# Patient Record
Sex: FEMALE | Race: OTHER | HISPANIC OR LATINO | URBAN - METROPOLITAN AREA
[De-identification: names, ages, dates, MRNs, and addresses within clinical notes are randomized per-mention and may not be internally consistent; named-entity substitution may affect disease eponyms.]

---

## 2021-12-28 ENCOUNTER — INPATIENT (INPATIENT)
Facility: HOSPITAL | Age: 66
LOS: 5 days | Discharge: HOME | DRG: 371 | End: 2022-01-03
Attending: SURGERY | Admitting: SURGERY
Payer: COMMERCIAL

## 2021-12-28 VITALS
OXYGEN SATURATION: 97 % | WEIGHT: 139.99 LBS | HEART RATE: 82 BPM | RESPIRATION RATE: 20 BRPM | DIASTOLIC BLOOD PRESSURE: 76 MMHG | TEMPERATURE: 99 F | SYSTOLIC BLOOD PRESSURE: 150 MMHG | HEIGHT: 62 IN

## 2021-12-28 DIAGNOSIS — K37 UNSPECIFIED APPENDICITIS: ICD-10-CM

## 2021-12-28 DIAGNOSIS — Z98.891 HISTORY OF UTERINE SCAR FROM PREVIOUS SURGERY: Chronic | ICD-10-CM

## 2021-12-28 LAB
ALBUMIN SERPL ELPH-MCNC: 2.8 G/DL — LOW (ref 3.5–5)
ALP SERPL-CCNC: 108 U/L — SIGNIFICANT CHANGE UP (ref 40–120)
ALT FLD-CCNC: 16 U/L DA — SIGNIFICANT CHANGE UP (ref 10–60)
ANION GAP SERPL CALC-SCNC: 8 MMOL/L — SIGNIFICANT CHANGE UP (ref 5–17)
AST SERPL-CCNC: 11 U/L — SIGNIFICANT CHANGE UP (ref 10–40)
BASOPHILS # BLD AUTO: 0.05 K/UL — SIGNIFICANT CHANGE UP (ref 0–0.2)
BASOPHILS NFR BLD AUTO: 0.3 % — SIGNIFICANT CHANGE UP (ref 0–2)
BILIRUB SERPL-MCNC: 0.7 MG/DL — SIGNIFICANT CHANGE UP (ref 0.2–1.2)
BLD GP AB SCN SERPL QL: SIGNIFICANT CHANGE UP
BUN SERPL-MCNC: 23 MG/DL — HIGH (ref 7–18)
CALCIUM SERPL-MCNC: 9.7 MG/DL — SIGNIFICANT CHANGE UP (ref 8.4–10.5)
CHLORIDE SERPL-SCNC: 92 MMOL/L — LOW (ref 96–108)
CO2 SERPL-SCNC: 32 MMOL/L — HIGH (ref 22–31)
CREAT SERPL-MCNC: 1.14 MG/DL — SIGNIFICANT CHANGE UP (ref 0.5–1.3)
EOSINOPHIL # BLD AUTO: 0.11 K/UL — SIGNIFICANT CHANGE UP (ref 0–0.5)
EOSINOPHIL NFR BLD AUTO: 0.7 % — SIGNIFICANT CHANGE UP (ref 0–6)
GLUCOSE SERPL-MCNC: 357 MG/DL — HIGH (ref 70–99)
HCT VFR BLD CALC: 44.2 % — SIGNIFICANT CHANGE UP (ref 34.5–45)
HGB BLD-MCNC: 14.9 G/DL — SIGNIFICANT CHANGE UP (ref 11.5–15.5)
IMM GRANULOCYTES NFR BLD AUTO: 0.8 % — SIGNIFICANT CHANGE UP (ref 0–1.5)
LACTATE SERPL-SCNC: 2.6 MMOL/L — HIGH (ref 0.7–2)
LIDOCAIN IGE QN: 63 U/L — LOW (ref 73–393)
LYMPHOCYTES # BLD AUTO: 1.64 K/UL — SIGNIFICANT CHANGE UP (ref 1–3.3)
LYMPHOCYTES # BLD AUTO: 10.7 % — LOW (ref 13–44)
MAGNESIUM SERPL-MCNC: 2.1 MG/DL — SIGNIFICANT CHANGE UP (ref 1.6–2.6)
MCHC RBC-ENTMCNC: 29.2 PG — SIGNIFICANT CHANGE UP (ref 27–34)
MCHC RBC-ENTMCNC: 33.7 GM/DL — SIGNIFICANT CHANGE UP (ref 32–36)
MCV RBC AUTO: 86.5 FL — SIGNIFICANT CHANGE UP (ref 80–100)
MONOCYTES # BLD AUTO: 1.43 K/UL — HIGH (ref 0–0.9)
MONOCYTES NFR BLD AUTO: 9.3 % — SIGNIFICANT CHANGE UP (ref 2–14)
NEUTROPHILS # BLD AUTO: 11.94 K/UL — HIGH (ref 1.8–7.4)
NEUTROPHILS NFR BLD AUTO: 78.2 % — HIGH (ref 43–77)
NRBC # BLD: 0 /100 WBCS — SIGNIFICANT CHANGE UP (ref 0–0)
PLATELET # BLD AUTO: 396 K/UL — SIGNIFICANT CHANGE UP (ref 150–400)
POTASSIUM SERPL-MCNC: 4.1 MMOL/L — SIGNIFICANT CHANGE UP (ref 3.5–5.3)
POTASSIUM SERPL-SCNC: 4.1 MMOL/L — SIGNIFICANT CHANGE UP (ref 3.5–5.3)
PROT SERPL-MCNC: 8.4 G/DL — HIGH (ref 6–8.3)
RAPID RVP RESULT: DETECTED
RBC # BLD: 5.11 M/UL — SIGNIFICANT CHANGE UP (ref 3.8–5.2)
RBC # FLD: 11.8 % — SIGNIFICANT CHANGE UP (ref 10.3–14.5)
SARS-COV-2 RNA SPEC QL NAA+PROBE: DETECTED
SODIUM SERPL-SCNC: 132 MMOL/L — LOW (ref 135–145)
WBC # BLD: 15.3 K/UL — HIGH (ref 3.8–10.5)
WBC # FLD AUTO: 15.3 K/UL — HIGH (ref 3.8–10.5)

## 2021-12-28 PROCEDURE — 74177 CT ABD & PELVIS W/CONTRAST: CPT | Mod: 26,MA

## 2021-12-28 PROCEDURE — 99285 EMERGENCY DEPT VISIT HI MDM: CPT

## 2021-12-28 PROCEDURE — 99223 1ST HOSP IP/OBS HIGH 75: CPT

## 2021-12-28 RX ORDER — ONDANSETRON 8 MG/1
4 TABLET, FILM COATED ORAL ONCE
Refills: 0 | Status: COMPLETED | OUTPATIENT
Start: 2021-12-28 | End: 2021-12-28

## 2021-12-28 RX ORDER — GLUCAGON INJECTION, SOLUTION 0.5 MG/.1ML
1 INJECTION, SOLUTION SUBCUTANEOUS ONCE
Refills: 0 | Status: DISCONTINUED | OUTPATIENT
Start: 2021-12-28 | End: 2022-01-03

## 2021-12-28 RX ORDER — KETOROLAC TROMETHAMINE 30 MG/ML
15 SYRINGE (ML) INJECTION ONCE
Refills: 0 | Status: DISCONTINUED | OUTPATIENT
Start: 2021-12-28 | End: 2021-12-28

## 2021-12-28 RX ORDER — SODIUM CHLORIDE 9 MG/ML
1000 INJECTION, SOLUTION INTRAVENOUS
Refills: 0 | Status: DISCONTINUED | OUTPATIENT
Start: 2021-12-28 | End: 2021-12-31

## 2021-12-28 RX ORDER — SODIUM CHLORIDE 9 MG/ML
1000 INJECTION, SOLUTION INTRAVENOUS
Refills: 0 | Status: DISCONTINUED | OUTPATIENT
Start: 2021-12-28 | End: 2022-01-03

## 2021-12-28 RX ORDER — MORPHINE SULFATE 50 MG/1
4 CAPSULE, EXTENDED RELEASE ORAL ONCE
Refills: 0 | Status: DISCONTINUED | OUTPATIENT
Start: 2021-12-28 | End: 2021-12-28

## 2021-12-28 RX ORDER — CEFOTETAN DISODIUM 1 G
1 VIAL (EA) INJECTION ONCE
Refills: 0 | Status: COMPLETED | OUTPATIENT
Start: 2021-12-28 | End: 2021-12-28

## 2021-12-28 RX ORDER — DEXTROSE 50 % IN WATER 50 %
25 SYRINGE (ML) INTRAVENOUS ONCE
Refills: 0 | Status: DISCONTINUED | OUTPATIENT
Start: 2021-12-28 | End: 2022-01-03

## 2021-12-28 RX ORDER — INSULIN LISPRO 100/ML
VIAL (ML) SUBCUTANEOUS EVERY 6 HOURS
Refills: 0 | Status: DISCONTINUED | OUTPATIENT
Start: 2021-12-28 | End: 2021-12-30

## 2021-12-28 RX ORDER — SODIUM CHLORIDE 9 MG/ML
1000 INJECTION INTRAMUSCULAR; INTRAVENOUS; SUBCUTANEOUS ONCE
Refills: 0 | Status: COMPLETED | OUTPATIENT
Start: 2021-12-28 | End: 2021-12-28

## 2021-12-28 RX ORDER — ACETAMINOPHEN 500 MG
1000 TABLET ORAL EVERY 6 HOURS
Refills: 0 | Status: DISCONTINUED | OUTPATIENT
Start: 2021-12-28 | End: 2022-01-03

## 2021-12-28 RX ORDER — ONDANSETRON 8 MG/1
4 TABLET, FILM COATED ORAL EVERY 6 HOURS
Refills: 0 | Status: DISCONTINUED | OUTPATIENT
Start: 2021-12-28 | End: 2022-01-03

## 2021-12-28 RX ORDER — FAMOTIDINE 10 MG/ML
20 INJECTION INTRAVENOUS ONCE
Refills: 0 | Status: COMPLETED | OUTPATIENT
Start: 2021-12-28 | End: 2021-12-28

## 2021-12-28 RX ORDER — DEXTROSE 50 % IN WATER 50 %
12.5 SYRINGE (ML) INTRAVENOUS ONCE
Refills: 0 | Status: DISCONTINUED | OUTPATIENT
Start: 2021-12-28 | End: 2022-01-03

## 2021-12-28 RX ORDER — DIPHENHYDRAMINE HCL 50 MG
50 CAPSULE ORAL ONCE
Refills: 0 | Status: DISCONTINUED | OUTPATIENT
Start: 2021-12-28 | End: 2022-01-03

## 2021-12-28 RX ORDER — PANTOPRAZOLE SODIUM 20 MG/1
40 TABLET, DELAYED RELEASE ORAL DAILY
Refills: 0 | Status: DISCONTINUED | OUTPATIENT
Start: 2021-12-28 | End: 2022-01-03

## 2021-12-28 RX ORDER — HYDROMORPHONE HYDROCHLORIDE 2 MG/ML
0.5 INJECTION INTRAMUSCULAR; INTRAVENOUS; SUBCUTANEOUS
Refills: 0 | Status: DISCONTINUED | OUTPATIENT
Start: 2021-12-28 | End: 2022-01-03

## 2021-12-28 RX ORDER — DEXTROSE 50 % IN WATER 50 %
15 SYRINGE (ML) INTRAVENOUS ONCE
Refills: 0 | Status: DISCONTINUED | OUTPATIENT
Start: 2021-12-28 | End: 2022-01-03

## 2021-12-28 RX ORDER — ACETAMINOPHEN 500 MG
1000 TABLET ORAL ONCE
Refills: 0 | Status: DISCONTINUED | OUTPATIENT
Start: 2021-12-28 | End: 2021-12-28

## 2021-12-28 RX ORDER — CEFOTETAN DISODIUM 1 G
2 VIAL (EA) INJECTION EVERY 12 HOURS
Refills: 0 | Status: DISCONTINUED | OUTPATIENT
Start: 2021-12-28 | End: 2022-01-03

## 2021-12-28 RX ADMIN — MORPHINE SULFATE 4 MILLIGRAM(S): 50 CAPSULE, EXTENDED RELEASE ORAL at 15:54

## 2021-12-28 RX ADMIN — ONDANSETRON 4 MILLIGRAM(S): 8 TABLET, FILM COATED ORAL at 15:53

## 2021-12-28 RX ADMIN — Medication 30 MILLILITER(S): at 15:54

## 2021-12-28 RX ADMIN — MORPHINE SULFATE 4 MILLIGRAM(S): 50 CAPSULE, EXTENDED RELEASE ORAL at 18:35

## 2021-12-28 RX ADMIN — PANTOPRAZOLE SODIUM 40 MILLIGRAM(S): 20 TABLET, DELAYED RELEASE ORAL at 22:01

## 2021-12-28 RX ADMIN — FAMOTIDINE 20 MILLIGRAM(S): 10 INJECTION INTRAVENOUS at 15:54

## 2021-12-28 RX ADMIN — Medication 100 GRAM(S): at 21:48

## 2021-12-28 RX ADMIN — SODIUM CHLORIDE 1000 MILLILITER(S): 9 INJECTION INTRAMUSCULAR; INTRAVENOUS; SUBCUTANEOUS at 18:35

## 2021-12-28 NOTE — ED PROVIDER NOTE - CLINICAL SUMMARY MEDICAL DECISION MAKING FREE TEXT BOX
66F presenting with abdominal pain. concern for diverticulitis, appendicitis, pancreatitis. will get labs, Ct abdomen. will reassess.

## 2021-12-28 NOTE — H&P ADULT - NSHPLABSRESULTS_GEN_ALL_CORE
LABS:                        14.9   15.30 )-----------( 396      ( 28 Dec 2021 16:19 )             44.2     12-28    132<L>  |  92<L>  |  23<H>  ----------------------------<  357<H>  4.1   |  32<H>  |  1.14    Ca    9.7      28 Dec 2021 16:19  Mg     2.1     12-28    TPro  8.4<H>  /  Alb  2.8<L>  /  TBili  0.7  /  DBili  x   /  AST  11  /  ALT  16  /  AlkPhos  108  12-28    RADIOLOGY & ADDITIONAL STUDIES:  CT scan with perforated appendicitis with abscesses.   Official report pending

## 2021-12-28 NOTE — H&P ADULT - ASSESSMENT
66 year old female with perforated appendicitis with abscesses, COVID+. Leukocytosis, electrolyte abnormalities   PMH DM and HTN    - admit to Dr Barber  - conservative management at this time, possible IR consult in the AM  - IV antibiotics  - will follow up official CT results  - FS with RISS  - NPO with IVF  - discussed with Dr. Yi

## 2021-12-28 NOTE — H&P ADULT - ATTENDING COMMENTS
Perforated appendicitis with multiple abscesses, > 7 days of symptoms. Palpable mass, no peritonitis.     Plan:  IR consult in AM for percutaneous drainage  IV Abx

## 2021-12-28 NOTE — ED PROVIDER NOTE - PHYSICAL EXAMINATION
General: uncomfortable appearing female, no acute distress   HEENT: normocephalic, atraumatic   Respiratory: normal work of breathing, lungs clear to auscultation bilaterally   Cardiac: regular rate and rhythm   Abdomen: soft, diffuse tenderness to palpation   MSK: no swelling or tenderness of lower extremities, moving all extremities spontaneously   Skin: warm, dry   Neuro: A&Ox3  Psych: appropriate affect

## 2021-12-28 NOTE — ED ADULT NURSE NOTE - BREATHING, MLM
Patient last ate between 1230 and 1. OR updated. All questions answered; OR to come get patient shortly.    Spontaneous, unlabored and symmetrical

## 2021-12-28 NOTE — ED PROVIDER NOTE - NSFOLLOWUPINSTRUCTIONS_ED_ALL_ED_FT
You were seen in the emergency department for abdominal pain.     Please follow-up with your primary care doctor in the next 24-48 hours.     If you have any worsening symptoms, severe headache, abdominal pain, nausea or vomiting, please return to the emergency department.

## 2021-12-28 NOTE — H&P ADULT - HISTORY OF PRESENT ILLNESS
66 year old female with PMH of HTN and DM and PSH of  presents to the ED complaining of epigastric abdominal pain for the past week associated with 3 episodes of vomiting yesterday. Patient denies current pain and nausea. Patient denies fever, chills, chest pain, shortness of breath, dysuria. Admits to flatus. Last BM 3 days ago. Denies history of COVID.

## 2021-12-28 NOTE — ED PROVIDER NOTE - OBJECTIVE STATEMENT
66F, pmh of hld, DM, presenting with abdominal pain for one week. no nausea, vomiting or diarrhea. no fever, chest pain or shortness of breath. no history of abdominal surgeries.

## 2021-12-28 NOTE — H&P ADULT - NSHPPHYSICALEXAM_GEN_ALL_CORE
Vital Signs Last 24 Hrs  T(C): 37.4 (28 Dec 2021 14:40), Max: 37.4 (28 Dec 2021 14:40)  T(F): 99.4 (28 Dec 2021 14:40), Max: 99.4 (28 Dec 2021 14:40)  HR: 82 (28 Dec 2021 14:40) (82 - 82)  BP: 150/76 (28 Dec 2021 14:40) (150/76 - 150/76)  RR: 20 (28 Dec 2021 14:40) (20 - 20)  SpO2: 97% (28 Dec 2021 14:40) (97% - 97%)    Physical Exam:    General:  Appears stated age, well-groomed, well-nourished, no distress  Eyes: EOMI  HENT:  WNL, no JVD  Chest: respirations nonlabored  Cardiovascular:  Regular rate & rhythm  Abdomen: soft, diffuse tenderness to palpation greatest in epigastric area, moderate distention, non-tympanic    Extremities: no edema bilaterally  Skin: warm and dry  Musculoskeletal: no calf tenderness  Neuro:  Alert, oriented to time, place and person   Psych: normal affect

## 2021-12-29 LAB
ANION GAP SERPL CALC-SCNC: 7 MMOL/L — SIGNIFICANT CHANGE UP (ref 5–17)
APPEARANCE UR: CLEAR — SIGNIFICANT CHANGE UP
APTT BLD: 33 SEC — SIGNIFICANT CHANGE UP (ref 27.5–35.5)
BACTERIA # UR AUTO: ABNORMAL /HPF
BASOPHILS # BLD AUTO: 0.03 K/UL — SIGNIFICANT CHANGE UP (ref 0–0.2)
BASOPHILS NFR BLD AUTO: 0.2 % — SIGNIFICANT CHANGE UP (ref 0–2)
BILIRUB UR-MCNC: ABNORMAL
BUN SERPL-MCNC: 16 MG/DL — SIGNIFICANT CHANGE UP (ref 7–18)
CALCIUM SERPL-MCNC: 9 MG/DL — SIGNIFICANT CHANGE UP (ref 8.4–10.5)
CHLORIDE SERPL-SCNC: 103 MMOL/L — SIGNIFICANT CHANGE UP (ref 96–108)
CO2 SERPL-SCNC: 31 MMOL/L — SIGNIFICANT CHANGE UP (ref 22–31)
COLOR SPEC: YELLOW — SIGNIFICANT CHANGE UP
CREAT SERPL-MCNC: 0.79 MG/DL — SIGNIFICANT CHANGE UP (ref 0.5–1.3)
DIFF PNL FLD: NEGATIVE — SIGNIFICANT CHANGE UP
EOSINOPHIL # BLD AUTO: 0.15 K/UL — SIGNIFICANT CHANGE UP (ref 0–0.5)
EOSINOPHIL NFR BLD AUTO: 1.1 % — SIGNIFICANT CHANGE UP (ref 0–6)
EPI CELLS # UR: SIGNIFICANT CHANGE UP /HPF
GLUCOSE BLDC GLUCOMTR-MCNC: 135 MG/DL — HIGH (ref 70–99)
GLUCOSE BLDC GLUCOMTR-MCNC: 139 MG/DL — HIGH (ref 70–99)
GLUCOSE BLDC GLUCOMTR-MCNC: 148 MG/DL — HIGH (ref 70–99)
GLUCOSE BLDC GLUCOMTR-MCNC: 148 MG/DL — HIGH (ref 70–99)
GLUCOSE SERPL-MCNC: 129 MG/DL — HIGH (ref 70–99)
GLUCOSE UR QL: NEGATIVE — SIGNIFICANT CHANGE UP
HCT VFR BLD CALC: 34.7 % — SIGNIFICANT CHANGE UP (ref 34.5–45)
HCV AB S/CO SERPL IA: 0.08 S/CO — SIGNIFICANT CHANGE UP (ref 0–0.99)
HCV AB SERPL-IMP: SIGNIFICANT CHANGE UP
HGB BLD-MCNC: 11.5 G/DL — SIGNIFICANT CHANGE UP (ref 11.5–15.5)
HYALINE CASTS # UR AUTO: ABNORMAL /LPF
IMM GRANULOCYTES NFR BLD AUTO: 0.8 % — SIGNIFICANT CHANGE UP (ref 0–1.5)
INR BLD: 1.11 RATIO — SIGNIFICANT CHANGE UP (ref 0.88–1.16)
KETONES UR-MCNC: ABNORMAL
LEUKOCYTE ESTERASE UR-ACNC: ABNORMAL
LYMPHOCYTES # BLD AUTO: 0.73 K/UL — LOW (ref 1–3.3)
LYMPHOCYTES # BLD AUTO: 5.6 % — LOW (ref 13–44)
MCHC RBC-ENTMCNC: 29.4 PG — SIGNIFICANT CHANGE UP (ref 27–34)
MCHC RBC-ENTMCNC: 33.1 GM/DL — SIGNIFICANT CHANGE UP (ref 32–36)
MCV RBC AUTO: 88.7 FL — SIGNIFICANT CHANGE UP (ref 80–100)
MONOCYTES # BLD AUTO: 1.12 K/UL — HIGH (ref 0–0.9)
MONOCYTES NFR BLD AUTO: 8.5 % — SIGNIFICANT CHANGE UP (ref 2–14)
NEUTROPHILS # BLD AUTO: 11.01 K/UL — HIGH (ref 1.8–7.4)
NEUTROPHILS NFR BLD AUTO: 83.8 % — HIGH (ref 43–77)
NITRITE UR-MCNC: NEGATIVE — SIGNIFICANT CHANGE UP
NRBC # BLD: 0 /100 WBCS — SIGNIFICANT CHANGE UP (ref 0–0)
PH UR: 6.5 — SIGNIFICANT CHANGE UP (ref 5–8)
PLATELET # BLD AUTO: 284 K/UL — SIGNIFICANT CHANGE UP (ref 150–400)
POTASSIUM SERPL-MCNC: 3.8 MMOL/L — SIGNIFICANT CHANGE UP (ref 3.5–5.3)
POTASSIUM SERPL-SCNC: 3.8 MMOL/L — SIGNIFICANT CHANGE UP (ref 3.5–5.3)
PROT UR-MCNC: 30 MG/DL
PROTHROM AB SERPL-ACNC: 13.1 SEC — SIGNIFICANT CHANGE UP (ref 10.6–13.6)
RBC # BLD: 3.91 M/UL — SIGNIFICANT CHANGE UP (ref 3.8–5.2)
RBC # FLD: 12 % — SIGNIFICANT CHANGE UP (ref 10.3–14.5)
RBC CASTS # UR COMP ASSIST: SIGNIFICANT CHANGE UP /HPF (ref 0–2)
SODIUM SERPL-SCNC: 141 MMOL/L — SIGNIFICANT CHANGE UP (ref 135–145)
SP GR SPEC: 1.01 — SIGNIFICANT CHANGE UP (ref 1.01–1.02)
UROBILINOGEN FLD QL: 4
WBC # BLD: 13.15 K/UL — HIGH (ref 3.8–10.5)
WBC # FLD AUTO: 13.15 K/UL — HIGH (ref 3.8–10.5)
WBC UR QL: SIGNIFICANT CHANGE UP /HPF (ref 0–5)

## 2021-12-29 PROCEDURE — 99232 SBSQ HOSP IP/OBS MODERATE 35: CPT

## 2021-12-29 PROCEDURE — 49406 IMAGE CATH FLUID PERI/RETRO: CPT

## 2021-12-29 RX ADMIN — Medication 6: at 00:40

## 2021-12-29 RX ADMIN — Medication 100 GRAM(S): at 06:25

## 2021-12-29 RX ADMIN — Medication 100 GRAM(S): at 17:34

## 2021-12-29 RX ADMIN — PANTOPRAZOLE SODIUM 40 MILLIGRAM(S): 20 TABLET, DELAYED RELEASE ORAL at 11:49

## 2021-12-29 NOTE — PROCEDURE NOTE - PROCEDURE FINDINGS AND DETAILS
CT guided drainage of right abdominal fluid collection performed with 8.5 Fr drainage catheter inserted. Appx 80cc green cloudy viscous foul smelling fluid drained. Sample collected for microbiology. Drain placed to bag.  Pt tolerated procedure without difficulty. Full report to follow.

## 2021-12-29 NOTE — PATIENT PROFILE ADULT - FALL HARM RISK - HARM RISK INTERVENTIONS

## 2021-12-29 NOTE — PRE PROCEDURE NOTE - HISTORY OF PRESENT ILLNESS
Interventional Radiology  Pre-Procedure Note    This is a 66y  Female  with appendicitis and right lower quadrant abscesses    HPI:  66 year old female with PMH of HTN and DM and PSH of  presents to the ED complaining of epigastric abdominal pain for the past week associated with 3 episodes of vomiting yesterday. Patient denies current pain and nausea. Patient denies fever, chills, chest pain, shortness of breath, dysuria. Admits to flatus. Last BM 3 days ago. Denies history of COVID.  (28 Dec 2021 19:05)      PAST MEDICAL & SURGICAL HISTORY:  DM (diabetes mellitus)    HTN (hypertension)    History of         Social History:     FAMILY HISTORY:      Allergies: penicillin (Hives; Other)      Current Medications: acetaminophen   IVPB .. 1000 milliGRAM(s) IV Intermittent every 6 hours PRN  cefoTEtan  IVPB 2 Gram(s) IV Intermittent every 12 hours  dextrose 40% Gel 15 Gram(s) Oral once  dextrose 5%. 1000 milliLiter(s) IV Continuous <Continuous>  dextrose 5%. 1000 milliLiter(s) IV Continuous <Continuous>  dextrose 50% Injectable 25 Gram(s) IV Push once  dextrose 50% Injectable 12.5 Gram(s) IV Push once  dextrose 50% Injectable 25 Gram(s) IV Push once  diphenhydrAMINE Injectable 50 milliGRAM(s) IV Push once PRN  glucagon  Injectable 1 milliGRAM(s) IntraMuscular once  HYDROmorphone  Injectable 0.5 milliGRAM(s) IV Push every 3 hours PRN  insulin lispro (ADMELOG) corrective regimen sliding scale   SubCutaneous every 6 hours  lactated ringers. 1000 milliLiter(s) IV Continuous <Continuous>  ondansetron Injectable 4 milliGRAM(s) IV Push every 6 hours PRN  pantoprazole  Injectable 40 milliGRAM(s) IV Push daily      Labs:                         11.5   13.15 )-----------( 284      ( 29 Dec 2021 07:09 )             34.7       12-    141  |  103  |  16  ----------------------------<  129<H>  3.8   |  31  |  0.79    Ca    9.0      29 Dec 2021 07:09  Mg     2.1     12-    TPro  8.4<H>  /  Alb  2.8<L>  /  TBili  0.7  /  DBili  x   /  AST  11  /  ALT  16  /  AlkPhos  108  12-    < from: CT Abdomen and Pelvis w/ IV Cont (21 @ 17:46) >  ACC: [id number] EXAM:  CT ABDOMEN AND PELVIS IC                          PROCEDURE DATE:  2021          INTERPRETATION:  CLINICAL INFORMATION: Diffuse abdominal pain.    COMPARISON: None.    CONTRAST/COMPLICATIONS:  IV Contrast: Omnipaque 350  90 cc administered   10 cc discarded  Oral Contrast: NONE  Complications: None reported at time of study completion    PROCEDURE:  CT of the Abdomen and Pelvis was performed.  Sagittal and coronal reformats were performed.    FINDINGS:    LOWER CHEST:No pleural effusion. Clustered tree-in-bud nodularity of the   lingula may reflect distal airways impaction or small airways infection.   There is a 0.2 cm right lower lobe nodule, image 9 series 2. Prominent   right-sided cardiophrenic nodes, measuring up to 9 mm, image 13 series 2.    LIVER: Liver size within normal limits. Main portal vein is patent. Trace   perihepatic fluid.  BILE DUCTS: No biliary distention.  GALLBLADDER: Unremarkable.  SPLEEN: Normal size.  PANCREAS: No main pancreatic ductal dilatation.  ADRENALS: Mild left adrenal gland nodular thickening. Unremarkable right   adrenal gland.  KIDNEYS/URETERS: No hydronephrosis of the kidneys. Subcentimeter   hypodensities of the kidneys are too small to characterize.    BLADDER: Underdistended.  REPRODUCTIVE ORGANS: Uterus and adnexa suboptimally characterized on CT.   Trace fluid along the adnexa, which are otherwise symmetric in   appearance. Loculated fluid along the cul-de-sac measures 2.8 x 1.5 x 1.4   cm, image 117 series 2.    BOWEL and PERITONEUM: Stomach is underdistended. There is mild distention   of the duodenal bulb.    There is proximal to mid small bowel distention with abrupt transition in   the mid abdomen in the region of several loculated fluid collections,   concerning for bowel obstruction, with transition at image 87 series 2,   approximately. The small bowel beyond these dominant collections is   collapsed. For reference, a mid abdominal loculated collection at the   site of transition adjacent to small bowel measures 7.2 x 3.9 x 5.6 cm,   image 98 series 2. A right upper pelvic collection adjacent to several   small bowel loops measures 4.5 x 2.5 x 2.6 cm, image 102 series 2.    The appendix is inflamed in the right lower quadrant, though not clearly   distended, with additional third loculated collection associated   associated with entire length of the appendix and base of the cecum,   measuring 5.1 x 2.3 x 4.6 cm, image 93 series 2. This suggests perforated   appendicitis.    The colon is non-inflamed, nondistended and unremarkable. There is   colonic diverticulosis, without diverticulitis.    There is additional mild mesenteric edema and scattered trace ascites.    PERITONEUM: As above. See 'bowel and peritoneum' section.  VESSELS:No aneurysm of the abdominal aorta. Atheromatous changes. In   particular, there is apparent severe stenosis of the left external iliac   artery, image 111 series 2. Correlate with dedicated CTA of the pelvis   for characterization. Central vein patency cannot be assessed due to   timing of contrast.  RETROPERITONEUM/LYMPH NODES: Small volume nodes.  ABDOMINAL WALL: Unremarkable.  BONES: Negative changes. Central canal and neural foramina are not   adequately assessed on this study.    IMPRESSION:    Perforated appendicitis with multiple abscesses and associated small   bowel obstruction. Abscesses measure 5.1 x 2.3 x 4.6cm at the perforated   appendix, 7.2 x 3.9 x 5.6cm at the site of bowel transition, and 4.5 x   2.5 x 2.6 cm along the right upper pelvis. Additional small abscess of   the cul-de-sac measuring 2.8 x 1.5 x 1.4 cm. Follow to resolution. These   findings were discussed with Dr. Mckeon from ER on 2021 at 6:24 PM.    Clustered tree-in-bud nodularity of the lingula may reflect distal   airways impaction or small airways infection. Follow to resolution.    No aneurysm of the abdominal aorta. Atheromatous changes. In particular,   there is apparent severe stenosis of the left external iliac artery,   image 111 series 2. Correlate with dedicated CTA of the pelvis for   characterization.    --- End of Report ---            GERARDO MONROE M.D., ATTENDING RADIOLOGIST  This document has been electronically signed. Dec 28 2021  6:30PM    < end of copied text >    Assessment/Plan:   This is a 66y Female  presents with perforated appendicitis and right lower abdominal abscesses. Case reviewed with Dr. Yi Surgery. Percutaneous window for access limited by interposed vessels and drainage is high risk. Surgeon feels that drainage is necessary. Procedure/ risks/ benefits/ goals/ alternatives were explained to the patient with the aid of .  All questions answered. Informed content obtained from patient. Consent placed in chart.

## 2021-12-30 ENCOUNTER — TRANSCRIPTION ENCOUNTER (OUTPATIENT)
Age: 66
End: 2021-12-30

## 2021-12-30 LAB
A1C WITH ESTIMATED AVERAGE GLUCOSE RESULT: 9.5 % — HIGH (ref 4–5.6)
ANION GAP SERPL CALC-SCNC: 8 MMOL/L — SIGNIFICANT CHANGE UP (ref 5–17)
BUN SERPL-MCNC: 11 MG/DL — SIGNIFICANT CHANGE UP (ref 7–18)
CALCIUM SERPL-MCNC: 8.3 MG/DL — LOW (ref 8.4–10.5)
CHLORIDE SERPL-SCNC: 104 MMOL/L — SIGNIFICANT CHANGE UP (ref 96–108)
CO2 SERPL-SCNC: 26 MMOL/L — SIGNIFICANT CHANGE UP (ref 22–31)
CREAT SERPL-MCNC: 0.97 MG/DL — SIGNIFICANT CHANGE UP (ref 0.5–1.3)
CULTURE RESULTS: SIGNIFICANT CHANGE UP
ESTIMATED AVERAGE GLUCOSE: 226 MG/DL — HIGH (ref 68–114)
GLUCOSE BLDC GLUCOMTR-MCNC: 139 MG/DL — HIGH (ref 70–99)
GLUCOSE BLDC GLUCOMTR-MCNC: 187 MG/DL — HIGH (ref 70–99)
GLUCOSE BLDC GLUCOMTR-MCNC: 203 MG/DL — HIGH (ref 70–99)
GLUCOSE BLDC GLUCOMTR-MCNC: 213 MG/DL — HIGH (ref 70–99)
GLUCOSE BLDC GLUCOMTR-MCNC: 253 MG/DL — HIGH (ref 70–99)
GLUCOSE SERPL-MCNC: 149 MG/DL — HIGH (ref 70–99)
HCT VFR BLD CALC: 35.5 % — SIGNIFICANT CHANGE UP (ref 34.5–45)
HGB BLD-MCNC: 11.8 G/DL — SIGNIFICANT CHANGE UP (ref 11.5–15.5)
MAGNESIUM SERPL-MCNC: 1.9 MG/DL — SIGNIFICANT CHANGE UP (ref 1.6–2.6)
MCHC RBC-ENTMCNC: 29.5 PG — SIGNIFICANT CHANGE UP (ref 27–34)
MCHC RBC-ENTMCNC: 33.2 GM/DL — SIGNIFICANT CHANGE UP (ref 32–36)
MCV RBC AUTO: 88.8 FL — SIGNIFICANT CHANGE UP (ref 80–100)
NRBC # BLD: 0 /100 WBCS — SIGNIFICANT CHANGE UP (ref 0–0)
PHOSPHATE SERPL-MCNC: 3 MG/DL — SIGNIFICANT CHANGE UP (ref 2.5–4.5)
PLATELET # BLD AUTO: 291 K/UL — SIGNIFICANT CHANGE UP (ref 150–400)
POTASSIUM SERPL-MCNC: 3.7 MMOL/L — SIGNIFICANT CHANGE UP (ref 3.5–5.3)
POTASSIUM SERPL-SCNC: 3.7 MMOL/L — SIGNIFICANT CHANGE UP (ref 3.5–5.3)
RBC # BLD: 4 M/UL — SIGNIFICANT CHANGE UP (ref 3.8–5.2)
RBC # FLD: 12.3 % — SIGNIFICANT CHANGE UP (ref 10.3–14.5)
SODIUM SERPL-SCNC: 138 MMOL/L — SIGNIFICANT CHANGE UP (ref 135–145)
SPECIMEN SOURCE: SIGNIFICANT CHANGE UP
WBC # BLD: 16.48 K/UL — HIGH (ref 3.8–10.5)
WBC # FLD AUTO: 16.48 K/UL — HIGH (ref 3.8–10.5)

## 2021-12-30 PROCEDURE — 99232 SBSQ HOSP IP/OBS MODERATE 35: CPT

## 2021-12-30 RX ORDER — LANOLIN ALCOHOL/MO/W.PET/CERES
3 CREAM (GRAM) TOPICAL AT BEDTIME
Refills: 0 | Status: DISCONTINUED | OUTPATIENT
Start: 2021-12-30 | End: 2022-01-03

## 2021-12-30 RX ORDER — INSULIN LISPRO 100/ML
VIAL (ML) SUBCUTANEOUS
Refills: 0 | Status: DISCONTINUED | OUTPATIENT
Start: 2021-12-30 | End: 2022-01-03

## 2021-12-30 RX ORDER — ENOXAPARIN SODIUM 100 MG/ML
40 INJECTION SUBCUTANEOUS DAILY
Refills: 0 | Status: DISCONTINUED | OUTPATIENT
Start: 2021-12-30 | End: 2022-01-03

## 2021-12-30 RX ADMIN — Medication 100 GRAM(S): at 05:00

## 2021-12-30 RX ADMIN — Medication 100 GRAM(S): at 17:48

## 2021-12-30 RX ADMIN — SODIUM CHLORIDE 100 MILLILITER(S): 9 INJECTION, SOLUTION INTRAVENOUS at 14:41

## 2021-12-30 RX ADMIN — Medication 6: at 23:34

## 2021-12-30 RX ADMIN — PANTOPRAZOLE SODIUM 40 MILLIGRAM(S): 20 TABLET, DELAYED RELEASE ORAL at 13:39

## 2021-12-30 RX ADMIN — ENOXAPARIN SODIUM 40 MILLIGRAM(S): 100 INJECTION SUBCUTANEOUS at 13:39

## 2021-12-30 RX ADMIN — Medication 4: at 17:51

## 2021-12-30 RX ADMIN — Medication 3 MILLIGRAM(S): at 23:18

## 2021-12-30 NOTE — DISCHARGE NOTE PROVIDER - NSDCCPCAREPLAN_GEN_ALL_CORE_FT
PRINCIPAL DISCHARGE DIAGNOSIS  Diagnosis: Appendicitis  Assessment and Plan of Treatment:       SECONDARY DISCHARGE DIAGNOSES  Diagnosis: COVID-19  Assessment and Plan of Treatment:      PRINCIPAL DISCHARGE DIAGNOSIS  Diagnosis: Appendicitis  Assessment and Plan of Treatment: Please follow-up with your surgeon in 1 week. Drink plenty of fluids and rest as needed. Call for any fever over 101, nausea, vomiting, severe pain, no passing of gas or bowel movement.   DIET  You may resume your regular diet as normal.   PAIN CONTROL  You may take Motrin (with food) or Tylenol  as needed for mild pain. Stagger one medication 3 hours after the other for maximum pain control. Maximum daily dose of Tylenol should not exceed 4grams/day.   Antibiotics  Please take the prescribed ciprofloxacin and metronidazole for 2 weeks.  Drain  Please keep drain area dry and clean.   Please monitor daily output of drain, or when drain is full.      SECONDARY DISCHARGE DIAGNOSES  Diagnosis: COVID-19  Assessment and Plan of Treatment: CORONAVIRUS INSTRUCTIONS: Based on your current clinical status and stability, it has been determined that you no longer need hospitalization and can recover while remaining in self-quarantine at home. You should follow the prevention steps below until a healthcare provider or local or state health department says you can return to your normal activities.   1. You should restrict activities outside your home, except for getting medical care.   2. Do not go to work, school, or public areas.   3. Avoid using public transportation, ride-sharing, or taxis.   4. Separate yourself from other people and animals in your home as much as possible.  When you are around other people (e.g., sharing a room or vehicle) you should wear a facemask.  5. Wash your hands often with soap and water for at least 20 seconds, especially after blowing your nose, coughing, or sneezing; going to the bathroom; and before eating or preparing food.  6. Cover your mouth and nose with a tissue when you cough or sneeze. Throw used tissues in a lined trash can. Immediately wash your hands with soap and water for at least 20 seconds  7. High touch surfaces include counters, tabletops, doorknobs, bathroom fixtures, toilets, phones, keyboards, tablets, and bedside tables.  8. Avoid sharing dishes, drinking glasses, cups, eating utensils, towels, or bedding with other people or pets in your home. After using these items, they should be washed thoroughly with soap and water.  You are strongly advised to seek prompt medical attention if your illness worsens or you develop new symptoms like fever or difficulty breathing.

## 2021-12-30 NOTE — DISCHARGE NOTE PROVIDER - CARE PROVIDER_API CALL
Luis Yi (MD)  Surgery  [address] [place] [address]  Phone: [phone]  Fax: [phone]  Follow Up Time:

## 2021-12-30 NOTE — DISCHARGE NOTE PROVIDER - NSDCFUADDAPPT_GEN_ALL_CORE_FT
Follow up with Dr. Yi in 1 week in office  Follow up with Dr. Yi in 1 week in office. Please call to schedule an appointment.

## 2021-12-30 NOTE — DISCHARGE NOTE PROVIDER - NSDCMRMEDTOKEN_GEN_ALL_CORE_FT
dicyclomine 10 mg oral capsule:   metFORMIN 1000 mg oral tablet: 1 tab(s) orally 2 times a day  omeprazole 20 mg oral delayed release capsule: 1 cap(s) orally once a day  Tradjenta 5 mg oral tablet: 1 tab(s) orally once a day   metFORMIN 1000 mg oral tablet: 1 tab(s) orally 2 times a day  omeprazole 20 mg oral delayed release capsule: 1 cap(s) orally once a day  Tradjenta 5 mg oral tablet: 1 tab(s) orally once a day   ciprofloxacin 500 mg oral tablet: 1 tab(s) orally 2 times a day   metFORMIN 1000 mg oral tablet: 1 tab(s) orally 2 times a day  metroNIDAZOLE 500 mg oral tablet: 1 tab(s) orally 3 times a day   omeprazole 20 mg oral delayed release capsule: 1 cap(s) orally once a day  Tradjenta 5 mg oral tablet: 1 tab(s) orally once a day

## 2021-12-30 NOTE — DISCHARGE NOTE PROVIDER - HOSPITAL COURSE
66 year old female with PMH of HTN and DM and PSH of  presents to the ED complaining of epigastric abdominal pain for the past week associated with 3 episodes of vomiting the day before. CT abd/pelvis showed perforated appendicitis with multiple intraabdominal abscesses. Pt placed on abx and IR consulted. IR drained 80cc of purulence from pt's abdomen.     **INCOMPLETE** 66 year old female with PMH of HTN and DM and PSH of  presents to the ED complaining of epigastric abdominal pain for the past week associated with 3 episodes of vomiting the day before. CT abd/pelvis showed perforated appendicitis with multiple intraabdominal abscesses. Pt placed on abx and IR consulted. IR drained 80cc of purulence from pt's abdomen. Antibiotics were given. Diet was advanced as tolerated and according to bowel function, and pain was well controlled on medication. At the time of discharge, the patient was hemodynamically stable, was tolerating PO diet, was voiding urine and passing bowel movement, was ambulating, and was comfortable with adequate pain control. The patient was instructed to follow up with Dr. Yi within 1 week after discharge from the hospital. The patient was instructed to follow up with her primary care provider regarding her elevated A1C and inadequate glucose control. Patient was advised for follow CDC guidelines regarding COVID positive test. Patient was told to return to the ED for any constitutional symptoms such as fever, chills, SOB, chest pain.  The patient agreed and felt comfortable with discharge.    66 year old female with PMH of HTN and DM and PSH of  presents to the ED complaining of epigastric abdominal pain for the past week associated with 3 episodes of vomiting the day before. CT abd/pelvis showed perforated appendicitis with multiple intraabdominal abscesses. Pt placed on abx and IR consulted. IR drained 80cc of purulence from pt's abdomen. Antibiotics were given. Repeat CT abd/pelvis showed the perforated appendix with significantly decreased associated fluid and infiltrative changes and other previously described collections decreased from prior exam, no new collections noted and no evidence of extraluminal gas. Diet was advanced as tolerated and according to bowel function, and pain was well controlled on medication. At the time of discharge, the patient was hemodynamically stable, was tolerating PO diet, was voiding urine and passing bowel movement, was ambulating, and was comfortable with adequate pain control. The patient was instructed to follow up with Dr. Yi within 1 week after discharge from the hospital. The patient was instructed to follow up with her primary care provider regarding her elevated A1C and inadequate glucose control. Patient was advised for follow CDC guidelines regarding COVID positive test. Patient was told to return to the ED for any constitutional symptoms such as fever, chills, SOB, chest pain.  The patient agreed and felt comfortable with discharge.

## 2021-12-31 LAB
ANION GAP SERPL CALC-SCNC: 9 MMOL/L — SIGNIFICANT CHANGE UP (ref 5–17)
BUN SERPL-MCNC: 7 MG/DL — SIGNIFICANT CHANGE UP (ref 7–18)
CALCIUM SERPL-MCNC: 8.3 MG/DL — LOW (ref 8.4–10.5)
CHLORIDE SERPL-SCNC: 105 MMOL/L — SIGNIFICANT CHANGE UP (ref 96–108)
CO2 SERPL-SCNC: 26 MMOL/L — SIGNIFICANT CHANGE UP (ref 22–31)
CREAT SERPL-MCNC: 0.67 MG/DL — SIGNIFICANT CHANGE UP (ref 0.5–1.3)
GLUCOSE BLDC GLUCOMTR-MCNC: 201 MG/DL — HIGH (ref 70–99)
GLUCOSE BLDC GLUCOMTR-MCNC: 226 MG/DL — HIGH (ref 70–99)
GLUCOSE BLDC GLUCOMTR-MCNC: 232 MG/DL — HIGH (ref 70–99)
GLUCOSE BLDC GLUCOMTR-MCNC: 305 MG/DL — HIGH (ref 70–99)
GLUCOSE SERPL-MCNC: 178 MG/DL — HIGH (ref 70–99)
MAGNESIUM SERPL-MCNC: 1.8 MG/DL — SIGNIFICANT CHANGE UP (ref 1.6–2.6)
PHOSPHATE SERPL-MCNC: 2.7 MG/DL — SIGNIFICANT CHANGE UP (ref 2.5–4.5)
POTASSIUM SERPL-MCNC: 4 MMOL/L — SIGNIFICANT CHANGE UP (ref 3.5–5.3)
POTASSIUM SERPL-SCNC: 4 MMOL/L — SIGNIFICANT CHANGE UP (ref 3.5–5.3)
SODIUM SERPL-SCNC: 140 MMOL/L — SIGNIFICANT CHANGE UP (ref 135–145)

## 2021-12-31 PROCEDURE — 99232 SBSQ HOSP IP/OBS MODERATE 35: CPT

## 2021-12-31 RX ADMIN — Medication 400 MILLIGRAM(S): at 18:10

## 2021-12-31 RX ADMIN — Medication 3 MILLIGRAM(S): at 21:34

## 2021-12-31 RX ADMIN — Medication 8: at 12:07

## 2021-12-31 RX ADMIN — Medication 100 GRAM(S): at 17:03

## 2021-12-31 RX ADMIN — Medication 4: at 17:03

## 2021-12-31 RX ADMIN — Medication 4: at 08:21

## 2021-12-31 RX ADMIN — SODIUM CHLORIDE 100 MILLILITER(S): 9 INJECTION, SOLUTION INTRAVENOUS at 05:56

## 2021-12-31 RX ADMIN — PANTOPRAZOLE SODIUM 40 MILLIGRAM(S): 20 TABLET, DELAYED RELEASE ORAL at 12:07

## 2021-12-31 RX ADMIN — Medication 4: at 21:34

## 2021-12-31 RX ADMIN — Medication 100 GRAM(S): at 05:57

## 2021-12-31 RX ADMIN — Medication 1000 MILLIGRAM(S): at 19:43

## 2021-12-31 RX ADMIN — ENOXAPARIN SODIUM 40 MILLIGRAM(S): 100 INJECTION SUBCUTANEOUS at 12:07

## 2021-12-31 NOTE — DIETITIAN INITIAL EVALUATION ADULT. - PERTINENT LABORATORY DATA
12-31 Na140 mmol/L Glu 178 mg/dL<H> K+ 4.0 mmol/L Cr  0.67 mg/dL BUN 7 mg/dL 12-31 Phos 2.7 mg/dL 12-28 Alb 2.8 g/dL<L>

## 2021-12-31 NOTE — DIETITIAN INITIAL EVALUATION ADULT. - OTHER INFO
unable to interview patient face to face as has covid-19. attempted to contact patient On extension in room but No reply . patient is s/p IR drainage of karen appendical abscess (12/29/21). diet just advanced To low-fiber, given DM history, suggest To add carbohydrate consistent To diet. A1C: 9.5, elevated but education deferred at this time.

## 2022-01-01 LAB
-  AMIKACIN: SIGNIFICANT CHANGE UP
-  AMOXICILLIN/CLAVULANIC ACID: SIGNIFICANT CHANGE UP
-  AMPICILLIN/SULBACTAM: SIGNIFICANT CHANGE UP
-  AMPICILLIN: SIGNIFICANT CHANGE UP
-  AZTREONAM: SIGNIFICANT CHANGE UP
-  CEFAZOLIN: SIGNIFICANT CHANGE UP
-  CEFEPIME: SIGNIFICANT CHANGE UP
-  CEFOXITIN: SIGNIFICANT CHANGE UP
-  CEFTRIAXONE: SIGNIFICANT CHANGE UP
-  CIPROFLOXACIN: SIGNIFICANT CHANGE UP
-  ERTAPENEM: SIGNIFICANT CHANGE UP
-  GENTAMICIN: SIGNIFICANT CHANGE UP
-  IMIPENEM: SIGNIFICANT CHANGE UP
-  IMIPENEM: SIGNIFICANT CHANGE UP
-  LEVOFLOXACIN: SIGNIFICANT CHANGE UP
-  MEROPENEM: SIGNIFICANT CHANGE UP
-  PIPERACILLIN/TAZOBACTAM: SIGNIFICANT CHANGE UP
-  TETRACYCLINE: SIGNIFICANT CHANGE UP
-  TOBRAMYCIN: SIGNIFICANT CHANGE UP
-  TRIMETHOPRIM/SULFAMETHOXAZOLE: SIGNIFICANT CHANGE UP
-  VANCOMYCIN: SIGNIFICANT CHANGE UP
ANION GAP SERPL CALC-SCNC: 7 MMOL/L — SIGNIFICANT CHANGE UP (ref 5–17)
BUN SERPL-MCNC: 12 MG/DL — SIGNIFICANT CHANGE UP (ref 7–18)
CALCIUM SERPL-MCNC: 8.4 MG/DL — SIGNIFICANT CHANGE UP (ref 8.4–10.5)
CHLORIDE SERPL-SCNC: 105 MMOL/L — SIGNIFICANT CHANGE UP (ref 96–108)
CO2 SERPL-SCNC: 28 MMOL/L — SIGNIFICANT CHANGE UP (ref 22–31)
CREAT SERPL-MCNC: 0.93 MG/DL — SIGNIFICANT CHANGE UP (ref 0.5–1.3)
CULTURE RESULTS: SIGNIFICANT CHANGE UP
GLUCOSE BLDC GLUCOMTR-MCNC: 204 MG/DL — HIGH (ref 70–99)
GLUCOSE BLDC GLUCOMTR-MCNC: 209 MG/DL — HIGH (ref 70–99)
GLUCOSE BLDC GLUCOMTR-MCNC: 229 MG/DL — HIGH (ref 70–99)
GLUCOSE BLDC GLUCOMTR-MCNC: 254 MG/DL — HIGH (ref 70–99)
GLUCOSE BLDC GLUCOMTR-MCNC: 267 MG/DL — HIGH (ref 70–99)
GLUCOSE SERPL-MCNC: 216 MG/DL — HIGH (ref 70–99)
HCT VFR BLD CALC: 35 % — SIGNIFICANT CHANGE UP (ref 34.5–45)
HGB BLD-MCNC: 11.7 G/DL — SIGNIFICANT CHANGE UP (ref 11.5–15.5)
MCHC RBC-ENTMCNC: 29.3 PG — SIGNIFICANT CHANGE UP (ref 27–34)
MCHC RBC-ENTMCNC: 33.4 GM/DL — SIGNIFICANT CHANGE UP (ref 32–36)
MCV RBC AUTO: 87.7 FL — SIGNIFICANT CHANGE UP (ref 80–100)
METHOD TYPE: SIGNIFICANT CHANGE UP
NRBC # BLD: 0 /100 WBCS — SIGNIFICANT CHANGE UP (ref 0–0)
ORGANISM # SPEC MICROSCOPIC CNT: SIGNIFICANT CHANGE UP
PLATELET # BLD AUTO: 284 K/UL — SIGNIFICANT CHANGE UP (ref 150–400)
POTASSIUM SERPL-MCNC: 4.3 MMOL/L — SIGNIFICANT CHANGE UP (ref 3.5–5.3)
POTASSIUM SERPL-SCNC: 4.3 MMOL/L — SIGNIFICANT CHANGE UP (ref 3.5–5.3)
RBC # BLD: 3.99 M/UL — SIGNIFICANT CHANGE UP (ref 3.8–5.2)
RBC # FLD: 12.2 % — SIGNIFICANT CHANGE UP (ref 10.3–14.5)
SODIUM SERPL-SCNC: 140 MMOL/L — SIGNIFICANT CHANGE UP (ref 135–145)
SPECIMEN SOURCE: SIGNIFICANT CHANGE UP
WBC # BLD: 9.74 K/UL — SIGNIFICANT CHANGE UP (ref 3.8–10.5)
WBC # FLD AUTO: 9.74 K/UL — SIGNIFICANT CHANGE UP (ref 3.8–10.5)

## 2022-01-01 RX ORDER — IOHEXOL 300 MG/ML
30 INJECTION, SOLUTION INTRAVENOUS ONCE
Refills: 0 | Status: COMPLETED | OUTPATIENT
Start: 2022-01-02 | End: 2022-01-02

## 2022-01-01 RX ADMIN — Medication 3 MILLIGRAM(S): at 22:14

## 2022-01-01 RX ADMIN — Medication 4: at 11:52

## 2022-01-01 RX ADMIN — Medication 100 GRAM(S): at 17:16

## 2022-01-01 RX ADMIN — Medication 6: at 17:16

## 2022-01-01 RX ADMIN — Medication 4: at 22:14

## 2022-01-01 RX ADMIN — Medication 100 GRAM(S): at 05:42

## 2022-01-01 RX ADMIN — ENOXAPARIN SODIUM 40 MILLIGRAM(S): 100 INJECTION SUBCUTANEOUS at 11:51

## 2022-01-01 RX ADMIN — Medication 4: at 08:15

## 2022-01-01 RX ADMIN — PANTOPRAZOLE SODIUM 40 MILLIGRAM(S): 20 TABLET, DELAYED RELEASE ORAL at 11:52

## 2022-01-02 LAB
ANION GAP SERPL CALC-SCNC: 9 MMOL/L — SIGNIFICANT CHANGE UP (ref 5–17)
BUN SERPL-MCNC: 10 MG/DL — SIGNIFICANT CHANGE UP (ref 7–18)
CALCIUM SERPL-MCNC: 9.3 MG/DL — SIGNIFICANT CHANGE UP (ref 8.4–10.5)
CHLORIDE SERPL-SCNC: 102 MMOL/L — SIGNIFICANT CHANGE UP (ref 96–108)
CO2 SERPL-SCNC: 28 MMOL/L — SIGNIFICANT CHANGE UP (ref 22–31)
CREAT SERPL-MCNC: 1.15 MG/DL — SIGNIFICANT CHANGE UP (ref 0.5–1.3)
GLUCOSE BLDC GLUCOMTR-MCNC: 194 MG/DL — HIGH (ref 70–99)
GLUCOSE BLDC GLUCOMTR-MCNC: 217 MG/DL — HIGH (ref 70–99)
GLUCOSE BLDC GLUCOMTR-MCNC: 250 MG/DL — HIGH (ref 70–99)
GLUCOSE BLDC GLUCOMTR-MCNC: 257 MG/DL — HIGH (ref 70–99)
GLUCOSE SERPL-MCNC: 233 MG/DL — HIGH (ref 70–99)
HCT VFR BLD CALC: 38.2 % — SIGNIFICANT CHANGE UP (ref 34.5–45)
HGB BLD-MCNC: 12.9 G/DL — SIGNIFICANT CHANGE UP (ref 11.5–15.5)
MCHC RBC-ENTMCNC: 28.9 PG — SIGNIFICANT CHANGE UP (ref 27–34)
MCHC RBC-ENTMCNC: 33.8 GM/DL — SIGNIFICANT CHANGE UP (ref 32–36)
MCV RBC AUTO: 85.7 FL — SIGNIFICANT CHANGE UP (ref 80–100)
NRBC # BLD: 0 /100 WBCS — SIGNIFICANT CHANGE UP (ref 0–0)
PLATELET # BLD AUTO: 291 K/UL — SIGNIFICANT CHANGE UP (ref 150–400)
POTASSIUM SERPL-MCNC: 4.1 MMOL/L — SIGNIFICANT CHANGE UP (ref 3.5–5.3)
POTASSIUM SERPL-SCNC: 4.1 MMOL/L — SIGNIFICANT CHANGE UP (ref 3.5–5.3)
RBC # BLD: 4.46 M/UL — SIGNIFICANT CHANGE UP (ref 3.8–5.2)
RBC # FLD: 12 % — SIGNIFICANT CHANGE UP (ref 10.3–14.5)
SODIUM SERPL-SCNC: 139 MMOL/L — SIGNIFICANT CHANGE UP (ref 135–145)
WBC # BLD: 8.24 K/UL — SIGNIFICANT CHANGE UP (ref 3.8–10.5)
WBC # FLD AUTO: 8.24 K/UL — SIGNIFICANT CHANGE UP (ref 3.8–10.5)

## 2022-01-02 PROCEDURE — 74177 CT ABD & PELVIS W/CONTRAST: CPT | Mod: 26,59

## 2022-01-02 PROCEDURE — 99232 SBSQ HOSP IP/OBS MODERATE 35: CPT

## 2022-01-02 RX ADMIN — Medication 3 MILLIGRAM(S): at 22:23

## 2022-01-02 RX ADMIN — Medication 100 GRAM(S): at 06:18

## 2022-01-02 RX ADMIN — ENOXAPARIN SODIUM 40 MILLIGRAM(S): 100 INJECTION SUBCUTANEOUS at 11:19

## 2022-01-02 RX ADMIN — Medication 4: at 11:20

## 2022-01-02 RX ADMIN — IOHEXOL 30 MILLILITER(S): 300 INJECTION, SOLUTION INTRAVENOUS at 14:37

## 2022-01-02 RX ADMIN — Medication 2: at 16:27

## 2022-01-02 RX ADMIN — PANTOPRAZOLE SODIUM 40 MILLIGRAM(S): 20 TABLET, DELAYED RELEASE ORAL at 11:19

## 2022-01-02 RX ADMIN — Medication 6: at 22:22

## 2022-01-02 RX ADMIN — Medication 100 GRAM(S): at 17:06

## 2022-01-02 RX ADMIN — Medication 4: at 07:45

## 2022-01-03 ENCOUNTER — TRANSCRIPTION ENCOUNTER (OUTPATIENT)
Age: 67
End: 2022-01-03

## 2022-01-03 VITALS
RESPIRATION RATE: 18 BRPM | TEMPERATURE: 98 F | HEART RATE: 71 BPM | OXYGEN SATURATION: 100 % | DIASTOLIC BLOOD PRESSURE: 58 MMHG | SYSTOLIC BLOOD PRESSURE: 151 MMHG

## 2022-01-03 PROBLEM — E11.9 TYPE 2 DIABETES MELLITUS WITHOUT COMPLICATIONS: Chronic | Status: ACTIVE | Noted: 2021-12-28

## 2022-01-03 LAB
GLUCOSE BLDC GLUCOMTR-MCNC: 253 MG/DL — HIGH (ref 70–99)
GLUCOSE BLDC GLUCOMTR-MCNC: 320 MG/DL — HIGH (ref 70–99)
HCT VFR BLD CALC: 35.6 % — SIGNIFICANT CHANGE UP (ref 34.5–45)
HGB BLD-MCNC: 12 G/DL — SIGNIFICANT CHANGE UP (ref 11.5–15.5)
MCHC RBC-ENTMCNC: 28.8 PG — SIGNIFICANT CHANGE UP (ref 27–34)
MCHC RBC-ENTMCNC: 33.7 GM/DL — SIGNIFICANT CHANGE UP (ref 32–36)
MCV RBC AUTO: 85.4 FL — SIGNIFICANT CHANGE UP (ref 80–100)
NRBC # BLD: 0 /100 WBCS — SIGNIFICANT CHANGE UP (ref 0–0)
PLATELET # BLD AUTO: 258 K/UL — SIGNIFICANT CHANGE UP (ref 150–400)
RBC # BLD: 4.17 M/UL — SIGNIFICANT CHANGE UP (ref 3.8–5.2)
RBC # FLD: 12.1 % — SIGNIFICANT CHANGE UP (ref 10.3–14.5)
WBC # BLD: 8.11 K/UL — SIGNIFICANT CHANGE UP (ref 3.8–10.5)
WBC # FLD AUTO: 8.11 K/UL — SIGNIFICANT CHANGE UP (ref 3.8–10.5)

## 2022-01-03 PROCEDURE — 83690 ASSAY OF LIPASE: CPT

## 2022-01-03 PROCEDURE — 85027 COMPLETE CBC AUTOMATED: CPT

## 2022-01-03 PROCEDURE — 85025 COMPLETE CBC W/AUTO DIFF WBC: CPT

## 2022-01-03 PROCEDURE — 49406 IMAGE CATH FLUID PERI/RETRO: CPT

## 2022-01-03 PROCEDURE — 0225U NFCT DS DNA&RNA 21 SARSCOV2: CPT

## 2022-01-03 PROCEDURE — C1769: CPT

## 2022-01-03 PROCEDURE — 82962 GLUCOSE BLOOD TEST: CPT

## 2022-01-03 PROCEDURE — 83605 ASSAY OF LACTIC ACID: CPT

## 2022-01-03 PROCEDURE — 36415 COLL VENOUS BLD VENIPUNCTURE: CPT

## 2022-01-03 PROCEDURE — 87205 SMEAR GRAM STAIN: CPT

## 2022-01-03 PROCEDURE — 86850 RBC ANTIBODY SCREEN: CPT

## 2022-01-03 PROCEDURE — 96375 TX/PRO/DX INJ NEW DRUG ADDON: CPT

## 2022-01-03 PROCEDURE — 87086 URINE CULTURE/COLONY COUNT: CPT

## 2022-01-03 PROCEDURE — 87077 CULTURE AEROBIC IDENTIFY: CPT

## 2022-01-03 PROCEDURE — 81001 URINALYSIS AUTO W/SCOPE: CPT

## 2022-01-03 PROCEDURE — 87186 SC STD MICRODIL/AGAR DIL: CPT

## 2022-01-03 PROCEDURE — 84100 ASSAY OF PHOSPHORUS: CPT

## 2022-01-03 PROCEDURE — 99285 EMERGENCY DEPT VISIT HI MDM: CPT | Mod: 25

## 2022-01-03 PROCEDURE — 74177 CT ABD & PELVIS W/CONTRAST: CPT

## 2022-01-03 PROCEDURE — 85610 PROTHROMBIN TIME: CPT

## 2022-01-03 PROCEDURE — 87070 CULTURE OTHR SPECIMN AEROBIC: CPT

## 2022-01-03 PROCEDURE — 85730 THROMBOPLASTIN TIME PARTIAL: CPT

## 2022-01-03 PROCEDURE — 86900 BLOOD TYPING SEROLOGIC ABO: CPT

## 2022-01-03 PROCEDURE — 86803 HEPATITIS C AB TEST: CPT

## 2022-01-03 PROCEDURE — 86901 BLOOD TYPING SEROLOGIC RH(D): CPT

## 2022-01-03 PROCEDURE — 96374 THER/PROPH/DIAG INJ IV PUSH: CPT

## 2022-01-03 PROCEDURE — 99232 SBSQ HOSP IP/OBS MODERATE 35: CPT

## 2022-01-03 PROCEDURE — 83735 ASSAY OF MAGNESIUM: CPT

## 2022-01-03 PROCEDURE — C1729: CPT

## 2022-01-03 PROCEDURE — 83036 HEMOGLOBIN GLYCOSYLATED A1C: CPT

## 2022-01-03 PROCEDURE — 80053 COMPREHEN METABOLIC PANEL: CPT

## 2022-01-03 PROCEDURE — 80048 BASIC METABOLIC PNL TOTAL CA: CPT

## 2022-01-03 RX ORDER — METRONIDAZOLE 500 MG
1 TABLET ORAL
Qty: 42 | Refills: 0
Start: 2022-01-03 | End: 2022-01-16

## 2022-01-03 RX ORDER — CIPROFLOXACIN LACTATE 400MG/40ML
1 VIAL (ML) INTRAVENOUS
Qty: 28 | Refills: 0
Start: 2022-01-03 | End: 2022-01-16

## 2022-01-03 RX ADMIN — ENOXAPARIN SODIUM 40 MILLIGRAM(S): 100 INJECTION SUBCUTANEOUS at 12:23

## 2022-01-03 RX ADMIN — Medication 6: at 08:17

## 2022-01-03 RX ADMIN — Medication 100 GRAM(S): at 06:14

## 2022-01-03 RX ADMIN — Medication 8: at 12:23

## 2022-01-03 NOTE — DISCHARGE NOTE NURSING/CASE MANAGEMENT/SOCIAL WORK - PATIENT PORTAL LINK FT
You can access the FollowMyHealth Patient Portal offered by [facility] by registering at the following website: [email or web address]. By joining [facility]’s FollowMyHealth portal, you will also be able to view your health information using other applications (apps) compatible with our system.

## 2022-01-03 NOTE — DISCHARGE NOTE NURSING/CASE MANAGEMENT/SOCIAL WORK - NSDCPEFALRISK_GEN_ALL_CORE
For information on Fall & Injury Prevention, visit: [email or web address] OR  [email or web address] OR  https://www.cdc.gov/steadi/patient.html

## 2022-01-03 NOTE — PROGRESS NOTE ADULT - SUBJECTIVE AND OBJECTIVE BOX
INTERVAL HPI/OVERNIGHT EVENTS:  Pt seen and examined at bedside. Complaining she is hungry, requesting food  Afebrile. Abdominal pain resolved     MEDICATIONS  (STANDING):  cefoTEtan  IVPB 2 Gram(s) IV Intermittent every 12 hours  dextrose 40% Gel 15 Gram(s) Oral once  dextrose 5%. 1000 milliLiter(s) (50 mL/Hr) IV Continuous <Continuous>  dextrose 5%. 1000 milliLiter(s) (100 mL/Hr) IV Continuous <Continuous>  dextrose 50% Injectable 25 Gram(s) IV Push once  dextrose 50% Injectable 12.5 Gram(s) IV Push once  dextrose 50% Injectable 25 Gram(s) IV Push once  enoxaparin Injectable 40 milliGRAM(s) SubCutaneous daily  glucagon  Injectable 1 milliGRAM(s) IntraMuscular once  insulin lispro (ADMELOG) corrective regimen sliding scale   SubCutaneous every 6 hours  lactated ringers. 1000 milliLiter(s) (100 mL/Hr) IV Continuous <Continuous>  pantoprazole  Injectable 40 milliGRAM(s) IV Push daily    MEDICATIONS  (PRN):  acetaminophen   IVPB .. 1000 milliGRAM(s) IV Intermittent every 6 hours PRN Moderate Pain (4 - 6)  diphenhydrAMINE Injectable 50 milliGRAM(s) IV Push once PRN Allergy symptoms  HYDROmorphone  Injectable 0.5 milliGRAM(s) IV Push every 3 hours PRN Severe Pain (7 - 10)  ondansetron Injectable 4 milliGRAM(s) IV Push every 6 hours PRN Nausea      Vital Signs Last 24 Hrs  T(C): 36.8 (30 Dec 2021 05:56), Max: 37.4 (29 Dec 2021 12:32)  T(F): 98.2 (30 Dec 2021 05:56), Max: 99.4 (29 Dec 2021 12:32)  HR: 77 (30 Dec 2021 05:56) (77 - 97)  BP: 132/56 (30 Dec 2021 05:56) (124/76 - 135/69)  RR: 18 (30 Dec 2021 05:56) (18 - 18)  SpO2: 98% (30 Dec 2021 05:56) (97% - 100%)    Physical:  General: A&Ox3. NAD  Chest: respiration unlabored  Abdomen: Soft nondistended, nontender. No guarding. IR drain with purulent drainage.     I&O's Detail    29 Dec 2021 07:01  -  30 Dec 2021 07:00  --------------------------------------------------------  IN:  Total IN: 0 mL    OUT:    Drain (mL): 100 mL  Total OUT: 100 mL    Total NET: -100 mL    LABS:                        11.8   16.48 )-----------( 291      ( 30 Dec 2021 07:29 )             35.5             12-30    138  |  104  |  11  ----------------------------<  149<H>  3.7   |  26  |  0.97    Ca    8.3<L>      30 Dec 2021 07:29  Phos  3.0     12-30  Mg     1.9     12-30    TPro  8.4<H>  /  Alb  2.8<L>  /  TBili  0.7  /  DBili  x   /  AST  11  /  ALT  16  /  AlkPhos  108  12-28      
Patient seen and examined at bedside with no complaints.   Admits to flatus/BM.   Denies pain, nausea/ vomiting.   Tolerating diet. On isolation secondary to COVID    Vital Signs Last 24 Hrs  T(F): 98 (01-02-22 @ 06:00), Max: 98.8 (01-01-22 @ 12:32)  HR: 70 (01-02-22 @ 06:00)  BP: 150/57 (01-02-22 @ 06:00)  RR: 18 (01-02-22 @ 06:00)  SpO2: 99% (01-02-22 @ 06:00)  POCT Blood Glucose.: 217 mg/dL (02 Jan 2022 07:39)    GENERAL: Alert, NAD  CHEST/LUNG: respirations nonlabored  ABDOMEN: soft, Minimal suprapubic tenderness to palpation, Nondistended; IR drain with purulent output 15ml/24hours  EXTREMITIES:  no calf tenderness, No edema    I&O's Detail    01 Jan 2022 07:01  -  02 Jan 2022 07:00  --------------------------------------------------------  IN:    Oral Fluid: 250 mL  Total IN: 250 mL    OUT:    Drain (mL): 15 mL  Total OUT: 15 mL    Total NET: 235 mL    LABS:                        12.9   8.24  )-----------( 291      ( 02 Jan 2022 08:51 )             38.2     01-02    139  |  102  |  10  ----------------------------<  233<H>  4.1   |  28  |  1.15    Ca    9.3      02 Jan 2022 08:51  
INTERVAL HPI/OVERNIGHT EVENTS:  Patient seen and examined at bedside.  Pt resting comfortably. No acute complaints.   Tolerating diet. Admits to flatus/ BM.   Ronaldo N/V, pain, SOB, fever, chills.   On isolation secondary to Covid.    MEDICATIONS  (STANDING):  cefoTEtan  IVPB 2 Gram(s) IV Intermittent every 12 hours  dextrose 40% Gel 15 Gram(s) Oral once  dextrose 5%. 1000 milliLiter(s) (50 mL/Hr) IV Continuous <Continuous>  dextrose 5%. 1000 milliLiter(s) (100 mL/Hr) IV Continuous <Continuous>  dextrose 50% Injectable 25 Gram(s) IV Push once  dextrose 50% Injectable 12.5 Gram(s) IV Push once  dextrose 50% Injectable 25 Gram(s) IV Push once  enoxaparin Injectable 40 milliGRAM(s) SubCutaneous daily  glucagon  Injectable 1 milliGRAM(s) IntraMuscular once  insulin lispro (ADMELOG) corrective regimen sliding scale   SubCutaneous Before meals and at bedtime  melatonin 3 milliGRAM(s) Oral at bedtime  pantoprazole  Injectable 40 milliGRAM(s) IV Push daily    MEDICATIONS  (PRN):  acetaminophen   IVPB .. 1000 milliGRAM(s) IV Intermittent every 6 hours PRN Moderate Pain (4 - 6)  diphenhydrAMINE Injectable 50 milliGRAM(s) IV Push once PRN Allergy symptoms  HYDROmorphone  Injectable 0.5 milliGRAM(s) IV Push every 3 hours PRN Severe Pain (7 - 10)  ondansetron Injectable 4 milliGRAM(s) IV Push every 6 hours PRN Nausea      Vital Signs Last 24 Hrs  T(C): 36.8 (03 Jan 2022 06:00), Max: 36.8 (02 Jan 2022 14:30)  T(F): 98.2 (03 Jan 2022 06:00), Max: 98.2 (02 Jan 2022 14:30)  HR: 68 (03 Jan 2022 06:00) (68 - 76)  BP: 142/49 (03 Jan 2022 06:00) (124/64 - 142/49)  BP(mean): --  RR: 17 (03 Jan 2022 06:00) (17 - 18)  SpO2: 100% (03 Jan 2022 06:00) (96% - 100%)    Physical:  General: A&Ox3. NAD.  Respiratory: non labored  Abdomen: Soft, nondistended, nontender, no rebound tenderness  IR drain in place, with purulent output 0ml/24 hr   Extremities: non edematous, no calf pain bilaterally      I&O's Detail    02 Jan 2022 07:01  -  03 Jan 2022 07:00  --------------------------------------------------------  IN:  Total IN: 0 mL    OUT:    Drain (mL): 0 mL  Total OUT: 0 mL    Total NET: 0 mL          LABS:                        12.0   8.11  )-----------( 258      ( 03 Jan 2022 09:12 )             35.6             01-02    139  |  102  |  10  ----------------------------<  233<H>  4.1   |  28  |  1.15    Ca    9.3      02 Jan 2022 08:51            
s/p IR drainage of periappendiceal abscess 12/29  On isolation for COVID19  Patient seen and examined at bedside with no complaints.   Admits to flatus/BM.   Denies pain, nausea/ vomiting.   Tolerating low fiber diet.    Vital Signs Last 24 Hrs  T(F): 97.9 (01-01-22 @ 05:40), Max: 98.4 (12-31-21 @ 20:40)  HR: 65 (01-01-22 @ 05:40)  BP: 150/57 (01-01-22 @ 05:40)  RR: 18 (01-01-22 @ 05:40)  SpO2: 97% (01-01-22 @ 05:40)  POCT Blood Glucose.: 229 mg/dL (01 Jan 2022 08:02)    GENERAL: Alert, NAD  CHEST/LUNG: respirations nonlabored  ABDOMEN: soft, Minimal suprapubic tenderness to palpation, Nondistended; IR drain with purulent output 60ml/24hours  EXTREMITIES:  no calf tenderness, No edema    I&O's Detail    31 Dec 2021 07:01  -  01 Jan 2022 07:00  --------------------------------------------------------  IN:  Total IN: 0 mL    OUT:    Drain (mL): 60 mL  Total OUT: 60 mL    Total NET: -60 mL    LABS:                        11.7   9.74  )-----------( 284      ( 01 Jan 2022 07:05 )             35.0     12-31    140  |  105  |  7   ----------------------------<  178<H>  4.0   |  26  |  0.67    Ca    8.3<L>      31 Dec 2021 05:55  Phos  2.7     12-31  Mg     1.8     12-31    
s/p IR drainage of periappendiceal abscess 12/29  Patient seen and examined at bedside with no complaints.   Admits to flatus/BM.   Denies pain, nausea/ vomiting.   Tolerating liquid diet.    Vital Signs Last 24 Hrs  T(F): 98.1 (12-31-21 @ 05:20), Max: 98.4 (12-30-21 @ 12:49)  HR: 66 (12-31-21 @ 05:20)  BP: 144/71 (12-31-21 @ 05:20)  RR: 18 (12-31-21 @ 05:20)  SpO2: 98% (12-31-21 @ 05:20)  POCT Blood Glucose.: 232 mg/dL (31 Dec 2021 07:38)    GENERAL: Alert, NAD  CHEST/LUNG: respirations nonlabored  ABDOMEN: soft, Nontender, Nondistended; IR drain with purulent output 65ml/24hours  EXTREMITIES:  no calf tenderness, No edema    I&O's Detail    30 Dec 2021 07:01  -  31 Dec 2021 07:00  --------------------------------------------------------  IN:  Total IN: 0 mL    OUT:    Drain (mL): 65 mL  Total OUT: 65 mL    Total NET: -65 mL    LABS:                        11.8   16.48 )-----------( 291      ( 30 Dec 2021 07:29 )             35.5     12-31    140  |  105  |  7   ----------------------------<  178<H>  4.0   |  26  |  0.67    Ca    8.3<L>      31 Dec 2021 05:55  Phos  2.7     12-31  Mg     1.8     12-31  
INTERVAL HPI/OVERNIGHT EVENTS:    No acute events overnight.   Pt resting comfortably. No acute complaints.   denies f/c  epigastric pain  Denies N/V    MEDICATIONS  (STANDING):  cefoTEtan  IVPB 2 Gram(s) IV Intermittent every 12 hours  dextrose 40% Gel 15 Gram(s) Oral once  dextrose 5%. 1000 milliLiter(s) (50 mL/Hr) IV Continuous <Continuous>  dextrose 5%. 1000 milliLiter(s) (100 mL/Hr) IV Continuous <Continuous>  dextrose 50% Injectable 25 Gram(s) IV Push once  dextrose 50% Injectable 12.5 Gram(s) IV Push once  dextrose 50% Injectable 25 Gram(s) IV Push once  glucagon  Injectable 1 milliGRAM(s) IntraMuscular once  insulin lispro (ADMELOG) corrective regimen sliding scale   SubCutaneous every 6 hours  lactated ringers. 1000 milliLiter(s) (100 mL/Hr) IV Continuous <Continuous>  pantoprazole  Injectable 40 milliGRAM(s) IV Push daily    MEDICATIONS  (PRN):  acetaminophen   IVPB .. 1000 milliGRAM(s) IV Intermittent every 6 hours PRN Moderate Pain (4 - 6)  diphenhydrAMINE Injectable 50 milliGRAM(s) IV Push once PRN Allergy symptoms  HYDROmorphone  Injectable 0.5 milliGRAM(s) IV Push every 3 hours PRN Severe Pain (7 - 10)  ondansetron Injectable 4 milliGRAM(s) IV Push every 6 hours PRN Nausea      Vital Signs Last 24 Hrs  T(C): 37.7 (29 Dec 2021 05:49), Max: 37.7 (29 Dec 2021 05:49)  T(F): 99.9 (29 Dec 2021 05:49), Max: 99.9 (29 Dec 2021 05:49)  HR: 64 (29 Dec 2021 05:49) (64 - 82)  BP: 140/56 (29 Dec 2021 05:49) (130/54 - 150/76)  BP(mean): --  RR: 18 (29 Dec 2021 05:49) (17 - 20)  SpO2: 100% (29 Dec 2021 05:49) (97% - 100%)      PHYSICAL EXAM  General: Alert and oriented, not in acute distress  Resp: Breathing unlabored  Abdomen: Soft, nondistended, epigastric ttp  : No quinn catheter, no dysuria or hematuria  Extremities: No pedal edema    I&O's Detail      LABS:                        11.5   13.15 )-----------( 284      ( 29 Dec 2021 07:09 )             34.7             12-29    141  |  103  |  16  ----------------------------<  129<H>  3.8   |  31  |  0.79    Ca    9.0      29 Dec 2021 07:09  Mg     2.1     12-28    TPro  8.4<H>  /  Alb  2.8<L>  /  TBili  0.7  /  DBili  x   /  AST  11  /  ALT  16  /  AlkPhos  108  12-28

## 2022-01-03 NOTE — PROGRESS NOTE ADULT - ASSESSMENT
66 year old female s/p IR drainage of periappendiceal abscess 12/29, perforated appendicitis. Leukocytosis    - continue IR drain, monitor output  - repeat CT scan likely Sunday   - continue antibiotics  - advance to low fiber diet  - discuss with Dr. Yi
66 year old female s/p IR drainage of periappendiceal abscess 12/29, perforated appendicitis. Leukocytosis resolved  COVID19+    - continue IR drain, monitor output  - repeat CT scan in AM with oral and IV cotnrast  - continue antibiotics  - continue low fiber diet  - discuss with Dr. Leiva
66 yoF with perforated appendicitis with multiple fluid collections, and SBO    low grade 99.9, otherwise vss; mild leuko 13  no obstructive symptoms, epigastric ttp likely from distension, nonperitonitic    - NPO with IVF support  - IR consulted for drainage -hepsq held  - continue abx  - dvt/gi ppx, pain control as needed  - d/w attending    
66 yoF with perforated appendicitis with multiple fluid collections s/p IR drainage     +flatus, leukocytosis trending up 16 (13)  abdominal pain resolved    - advance to clear liquid diet  - continue abx  - monitor drain output  - dvt/gi ppx, pain control as needed  
66 year old female s/p IR drainage of periappendiceal abscess 12/29, perforated appendicitis. Leukocytosis resolved  COVID19+    - continue IR drain, monitor output  - repeat CT scan today with oral and IV contrast  - continue antibiotics  - continue low fiber diet  - discharge planning pending CT results  - discuss with Dr. Leiva
66y.o. Female s/p IR drainage of periappendiceal abscess 12/29, perforated appendicitis.   Leukocytosis resolved  COVID19+    - continue IR drain, monitor output   - continue abx  - continue diet as tolerated  - continue COVID isolation precautions   - Pain control, Anti emetic, Anti pyretic PRN   - DVT ppx, ambulate as tolerated, OOB   - d/c planning pending tolerating regular diet and vitals stable   - discussed and confirmed with Dr. Yi

## 2022-01-03 NOTE — PROGRESS NOTE ADULT - ATTENDING COMMENTS
Improving, +BM, tolerating diet. Drain - purulent.     Plan:  Continue Abx  Awaiting culture + sensitivity  Likely d/c home with the drain in place  Repeat CT on Sunday with oral and IV contrast
No abdominal symptoms, CT - resolution of abscesses. Culture - sensistive organisms.     Plan:  d/c home with drain in place, VNS  Cipro/Flagyl PO for 2 weeks  f/u in the office next week
Perforated appendicitis s/p IR drain  Denies pain, nausea or emesis. Having some bowel function    abd soft, non-distended, minimally tender to palpation  drain w/ purulent output    - continue diet  - continue abx  - repeat CT scan today  - continue COVID isolation precautions    Xochiltkejenna Leiva MD  Attending Physician
Significantly improved, drain - purulent.     Plan:  Advance diet to low fiber tomorrow  Awaiting culture results  Repeat CT on Sunday

## 2022-01-04 ENCOUNTER — TRANSCRIPTION ENCOUNTER (OUTPATIENT)
Age: 67
End: 2022-01-04

## 2022-01-05 PROBLEM — Z00.00 ENCOUNTER FOR PREVENTIVE HEALTH EXAMINATION: Status: ACTIVE | Noted: 2022-01-05

## 2022-01-07 ENCOUNTER — NON-APPOINTMENT (OUTPATIENT)
Age: 67
End: 2022-01-07

## 2022-01-11 ENCOUNTER — APPOINTMENT (OUTPATIENT)
Dept: SURGERY | Facility: CLINIC | Age: 67
End: 2022-01-11
Payer: MEDICARE

## 2022-01-11 VITALS — HEART RATE: 67 BPM | DIASTOLIC BLOOD PRESSURE: 77 MMHG | SYSTOLIC BLOOD PRESSURE: 167 MMHG

## 2022-01-11 VITALS — HEIGHT: 62 IN | BODY MASS INDEX: 27.6 KG/M2 | WEIGHT: 150 LBS

## 2022-01-11 VITALS — TEMPERATURE: 96.8 F

## 2022-01-11 DIAGNOSIS — E11.9 TYPE 2 DIABETES MELLITUS W/OUT COMPLICATIONS: ICD-10-CM

## 2022-01-11 DIAGNOSIS — E78.00 PURE HYPERCHOLESTEROLEMIA, UNSPECIFIED: ICD-10-CM

## 2022-01-11 DIAGNOSIS — Z87.891 PERSONAL HISTORY OF NICOTINE DEPENDENCE: ICD-10-CM

## 2022-01-11 PROCEDURE — 99213 OFFICE O/P EST LOW 20 MIN: CPT

## 2022-01-11 RX ORDER — METFORMIN HYDROCHLORIDE 1000 MG/1
1000 TABLET, COATED ORAL
Refills: 0 | Status: ACTIVE | COMMUNITY

## 2022-01-11 RX ORDER — METRONIDAZOLE 500 MG/1
500 TABLET ORAL
Refills: 0 | Status: ACTIVE | COMMUNITY

## 2022-01-11 RX ORDER — LINAGLIPTIN 5 MG/1
TABLET, FILM COATED ORAL
Refills: 0 | Status: ACTIVE | COMMUNITY

## 2022-01-11 RX ORDER — OMEPRAZOLE 20 MG/1
20 CAPSULE, DELAYED RELEASE ORAL
Refills: 0 | Status: ACTIVE | COMMUNITY

## 2022-01-19 ENCOUNTER — OUTPATIENT (OUTPATIENT)
Dept: OUTPATIENT SERVICES | Facility: HOSPITAL | Age: 67
LOS: 1 days | End: 2022-01-19

## 2022-01-19 ENCOUNTER — APPOINTMENT (OUTPATIENT)
Dept: INTERVENTIONAL RADIOLOGY/VASCULAR | Facility: HOSPITAL | Age: 67
End: 2022-01-19

## 2022-01-19 DIAGNOSIS — Z98.891 HISTORY OF UTERINE SCAR FROM PREVIOUS SURGERY: Chronic | ICD-10-CM

## 2022-01-19 DIAGNOSIS — K35.32 ACUTE APPENDICITIS WITH PERFORATION, LOCALIZED PERITONITIS, AND GANGRENE, WITHOUT ABSCESS: ICD-10-CM

## 2022-01-24 NOTE — PATIENT PROFILE ADULT - WILL THE PATIENT ACCEPT THE PFIZER COVID-19 VACCINE IF ELIGIBLE AND IT IS AVAILABLE?
History of laparoscopic appendectomy on January 11, 2022   Post BEL drain  Patient was on IV Zosyn which was changed to IV Unasyn as per ID   BEL drain output is minimal Yes

## 2022-01-25 ENCOUNTER — OUTPATIENT (OUTPATIENT)
Dept: OUTPATIENT SERVICES | Facility: HOSPITAL | Age: 67
LOS: 1 days | End: 2022-01-25
Payer: COMMERCIAL

## 2022-01-25 ENCOUNTER — APPOINTMENT (OUTPATIENT)
Dept: SURGERY | Facility: CLINIC | Age: 67
End: 2022-01-25
Payer: MEDICARE

## 2022-01-25 VITALS
OXYGEN SATURATION: 98 % | DIASTOLIC BLOOD PRESSURE: 74 MMHG | TEMPERATURE: 98 F | WEIGHT: 149.91 LBS | RESPIRATION RATE: 17 BRPM | SYSTOLIC BLOOD PRESSURE: 146 MMHG | HEIGHT: 58 IN | HEART RATE: 71 BPM

## 2022-01-25 VITALS — TEMPERATURE: 97.3 F

## 2022-01-25 VITALS
WEIGHT: 150 LBS | SYSTOLIC BLOOD PRESSURE: 165 MMHG | BODY MASS INDEX: 27.6 KG/M2 | DIASTOLIC BLOOD PRESSURE: 74 MMHG | HEART RATE: 79 BPM | HEIGHT: 62 IN

## 2022-01-25 DIAGNOSIS — K35.32 ACUTE APPENDICITIS WITH PERFORATION, LOCALIZED PERITONITIS, AND GANGRENE, WITHOUT ABSCESS: ICD-10-CM

## 2022-01-25 DIAGNOSIS — Z98.891 HISTORY OF UTERINE SCAR FROM PREVIOUS SURGERY: Chronic | ICD-10-CM

## 2022-01-25 DIAGNOSIS — Z01.818 ENCOUNTER FOR OTHER PREPROCEDURAL EXAMINATION: ICD-10-CM

## 2022-01-25 DIAGNOSIS — Z29.9 ENCOUNTER FOR PROPHYLACTIC MEASURES, UNSPECIFIED: ICD-10-CM

## 2022-01-25 DIAGNOSIS — G47.33 OBSTRUCTIVE SLEEP APNEA (ADULT) (PEDIATRIC): ICD-10-CM

## 2022-01-25 DIAGNOSIS — K21.9 GASTRO-ESOPHAGEAL REFLUX DISEASE WITHOUT ESOPHAGITIS: ICD-10-CM

## 2022-01-25 DIAGNOSIS — E11.9 TYPE 2 DIABETES MELLITUS WITHOUT COMPLICATIONS: ICD-10-CM

## 2022-01-25 LAB — BLD GP AB SCN SERPL QL: SIGNIFICANT CHANGE UP

## 2022-01-25 PROCEDURE — G0463: CPT

## 2022-01-25 PROCEDURE — 99213 OFFICE O/P EST LOW 20 MIN: CPT

## 2022-01-25 RX ORDER — METFORMIN HYDROCHLORIDE 850 MG/1
1 TABLET ORAL
Qty: 0 | Refills: 0 | DISCHARGE

## 2022-01-25 NOTE — HISTORY OF PRESENT ILLNESS
[de-identified] : INEZ NICKEROSN is a 66 year old female with PMHx of HTN and DM and PSH of  who presents in the office s/p perforated appendicitis 1 month ago. She underwent a pigtail drain placement by IR, subsequent CT scan showed resolution of the abscess.\par \par She went to IR last week for drain study and possible removal, but the procedure was cancelled due to positive COVID19 PCR\par \par On exam - she is comfortable, the drain is draining minimal brownish fluid - fecal fistula cannot be ruled out. The drain was flushed and the dressing around it was changed. \par \par Will plan for interval appendectomy as scheduled next week. Drain will stay in place until the surgery in case a fistula is present.

## 2022-01-25 NOTE — H&P PST ADULT - NSICDXPASTMEDICALHX_GEN_ALL_CORE_FT
PAST MEDICAL HISTORY:  DM (diabetes mellitus)     GERD (gastroesophageal reflux disease)     RYLIE (obstructive sleep apnea) . Does not use CPAP anymore

## 2022-01-25 NOTE — H&P PST ADULT - NEGATIVE ALLERGY TYPES
no outdoor environmental allergies/no indoor environmental allergies/no reactions to food/no reactions to insect bites

## 2022-01-25 NOTE — PHYSICAL EXAM
[Normal Breath Sounds] : Normal breath sounds [Normal Heart Sounds] : normal heart sounds [Calm] : calm [de-identified] : NAD [de-identified] : WNL [de-identified] : MIKAYLAL [de-identified] : soft, non-tender, no mass. Pigtail drain - minimal brownish output, site without signs of infection.

## 2022-01-25 NOTE — H&P PST ADULT - PROBLEM SELECTOR PLAN 2
Patient was diagnosed with sleep apnea "a long time ago" and was using CPAP  She states now she is better, the company took back the CPAP machine  Treat as if with RYLIE.  RYLIE protocol periop

## 2022-01-25 NOTE — H&P PST ADULT - ASSESSMENT
11/13/19 1100   Plan   Plan SS attempted to contact [facility] [phone] without success.  Contacted [facility] [phone] per Sonja 67 yo female is scheduled for : Laparoscopic Interval Appendectomy, on 2/3/22

## 2022-01-25 NOTE — H&P PST ADULT - NSICDXFAMILYHX_GEN_ALL_CORE_FT
FAMILY HISTORY:  Sibling  Still living? Yes, Estimated age: Age Unknown  FH: type 2 diabetes, Age at diagnosis: Age Unknown

## 2022-01-25 NOTE — H&P PST ADULT - HISTORY OF PRESENT ILLNESS
67 yo female with history of DM, GERD, Obesity, RYLIE reports the above.  I had a lot of pain in my right abdomen in December, I found out that my appendix was the problem. They put a drain right now. I still feel some pain at times, said patient. She is scheduled for : Laparoscopic Interval Appendectomy, on 2/3/22

## 2022-02-01 LAB — SARS-COV-2 N GENE NPH QL NAA+PROBE: DETECTED

## 2022-02-02 ENCOUNTER — TRANSCRIPTION ENCOUNTER (OUTPATIENT)
Age: 67
End: 2022-02-02

## 2022-02-03 ENCOUNTER — APPOINTMENT (OUTPATIENT)
Dept: SURGERY | Facility: HOSPITAL | Age: 67
End: 2022-02-03

## 2022-02-03 ENCOUNTER — OUTPATIENT (OUTPATIENT)
Dept: OUTPATIENT SERVICES | Facility: HOSPITAL | Age: 67
LOS: 1 days | End: 2022-02-03
Payer: COMMERCIAL

## 2022-02-03 ENCOUNTER — RESULT REVIEW (OUTPATIENT)
Age: 67
End: 2022-02-03

## 2022-02-03 VITALS
WEIGHT: 149.91 LBS | TEMPERATURE: 98 F | RESPIRATION RATE: 16 BRPM | HEART RATE: 70 BPM | DIASTOLIC BLOOD PRESSURE: 54 MMHG | OXYGEN SATURATION: 100 % | HEIGHT: 58 IN | SYSTOLIC BLOOD PRESSURE: 131 MMHG

## 2022-02-03 VITALS
HEART RATE: 79 BPM | OXYGEN SATURATION: 98 % | DIASTOLIC BLOOD PRESSURE: 61 MMHG | RESPIRATION RATE: 16 BRPM | SYSTOLIC BLOOD PRESSURE: 112 MMHG | TEMPERATURE: 98 F

## 2022-02-03 DIAGNOSIS — Z98.891 HISTORY OF UTERINE SCAR FROM PREVIOUS SURGERY: Chronic | ICD-10-CM

## 2022-02-03 DIAGNOSIS — K35.32 ACUTE APPENDICITIS WITH PERFORATION, LOCALIZED PERITONITIS, AND GANGRENE, WITHOUT ABSCESS: ICD-10-CM

## 2022-02-03 DIAGNOSIS — Z01.818 ENCOUNTER FOR OTHER PREPROCEDURAL EXAMINATION: ICD-10-CM

## 2022-02-03 LAB
BLD GP AB SCN SERPL QL: SIGNIFICANT CHANGE UP
GLUCOSE BLDC GLUCOMTR-MCNC: 169 MG/DL — HIGH (ref 70–99)
GLUCOSE BLDC GLUCOMTR-MCNC: 169 MG/DL — HIGH (ref 70–99)

## 2022-02-03 PROCEDURE — C9399: CPT

## 2022-02-03 PROCEDURE — 88304 TISSUE EXAM BY PATHOLOGIST: CPT | Mod: 26

## 2022-02-03 PROCEDURE — 44970 LAPAROSCOPY APPENDECTOMY: CPT

## 2022-02-03 PROCEDURE — 86900 BLOOD TYPING SEROLOGIC ABO: CPT

## 2022-02-03 PROCEDURE — 82962 GLUCOSE BLOOD TEST: CPT

## 2022-02-03 PROCEDURE — 86901 BLOOD TYPING SEROLOGIC RH(D): CPT

## 2022-02-03 PROCEDURE — 44970 LAPAROSCOPY APPENDECTOMY: CPT | Mod: 22

## 2022-02-03 PROCEDURE — 88304 TISSUE EXAM BY PATHOLOGIST: CPT

## 2022-02-03 PROCEDURE — 36415 COLL VENOUS BLD VENIPUNCTURE: CPT

## 2022-02-03 PROCEDURE — 44970 LAPAROSCOPY APPENDECTOMY: CPT | Mod: AS

## 2022-02-03 PROCEDURE — C1889: CPT

## 2022-02-03 PROCEDURE — 86850 RBC ANTIBODY SCREEN: CPT

## 2022-02-03 DEVICE — STAPLER COVIDIEN TRI-STAPLE 45MM PURPLE RELOAD: Type: IMPLANTABLE DEVICE | Status: FUNCTIONAL

## 2022-02-03 DEVICE — STAPLER COVIDIEN TRI-STAPLE 45MM TAN RELOAD: Type: IMPLANTABLE DEVICE | Status: FUNCTIONAL

## 2022-02-03 DEVICE — CLIP APPLIER COVIDIEN ENDOCLIP II 10MM MED/LG: Type: IMPLANTABLE DEVICE | Status: FUNCTIONAL

## 2022-02-03 RX ORDER — SODIUM CHLORIDE 9 MG/ML
1000 INJECTION, SOLUTION INTRAVENOUS
Refills: 0 | Status: DISCONTINUED | OUTPATIENT
Start: 2022-02-03 | End: 2022-02-03

## 2022-02-03 RX ORDER — METFORMIN HYDROCHLORIDE 850 MG/1
1 TABLET ORAL
Qty: 0 | Refills: 0 | DISCHARGE

## 2022-02-03 RX ORDER — FENTANYL CITRATE 50 UG/ML
50 INJECTION INTRAVENOUS
Refills: 0 | Status: DISCONTINUED | OUTPATIENT
Start: 2022-02-03 | End: 2022-02-03

## 2022-02-03 RX ORDER — IBUPROFEN 200 MG
600 TABLET ORAL ONCE
Refills: 0 | Status: DISCONTINUED | OUTPATIENT
Start: 2022-02-03 | End: 2022-02-10

## 2022-02-03 RX ORDER — FENTANYL CITRATE 50 UG/ML
25 INJECTION INTRAVENOUS
Refills: 0 | Status: DISCONTINUED | OUTPATIENT
Start: 2022-02-03 | End: 2022-02-03

## 2022-02-03 RX ORDER — IBUPROFEN 200 MG
1 TABLET ORAL
Qty: 10 | Refills: 0
Start: 2022-02-03 | End: 2022-02-07

## 2022-02-03 RX ORDER — SODIUM CHLORIDE 9 MG/ML
3 INJECTION INTRAMUSCULAR; INTRAVENOUS; SUBCUTANEOUS EVERY 8 HOURS
Refills: 0 | Status: DISCONTINUED | OUTPATIENT
Start: 2022-02-03 | End: 2022-02-03

## 2022-02-03 RX ORDER — LINAGLIPTIN 5 MG/1
1 TABLET, FILM COATED ORAL
Qty: 0 | Refills: 0 | DISCHARGE

## 2022-02-03 RX ORDER — OMEPRAZOLE 10 MG/1
1 CAPSULE, DELAYED RELEASE ORAL
Qty: 0 | Refills: 0 | DISCHARGE

## 2022-02-03 RX ORDER — ACETAMINOPHEN 500 MG
2 TABLET ORAL
Qty: 0 | Refills: 0 | DISCHARGE

## 2022-02-03 RX ADMIN — SODIUM CHLORIDE 3 MILLILITER(S): 9 INJECTION INTRAMUSCULAR; INTRAVENOUS; SUBCUTANEOUS at 07:32

## 2022-02-03 NOTE — ASU DISCHARGE PLAN (ADULT/PEDIATRIC) - CARE PROVIDER_API CALL
Luis Yi (MD)  Surgery  [address] [place] [address]  Phone: [phone]  Fax: [phone]  Follow Up Time: 2 weeks

## 2022-02-03 NOTE — ASU DISCHARGE PLAN (ADULT/PEDIATRIC) - NS MD DC FALL RISK RISK
For information on Fall & Injury Prevention, visit: [email or web address] OR  [email or web address] OR  https://www.cdc.gov/steadi/patient.html

## 2022-02-03 NOTE — ASU PREOP CHECKLIST - 1.
COVID positive from 2/1/22, (tested positive from 1/15/22)  as per nurse manager Felipa dinh to proceed w/ [email or web address]

## 2022-02-03 NOTE — BRIEF OPERATIVE NOTE - NSICDXBRIEFPOSTOP_GEN_ALL_CORE_FT
POST-OP DIAGNOSIS:  Acute appendicitis with perforation and localized peritonitis 03-Feb-2022 10:48:36  Isabelle Craig

## 2022-02-03 NOTE — ASU DISCHARGE PLAN (ADULT/PEDIATRIC) - ASU DC SPECIAL INSTRUCTIONSFT
Please follow-up with your surgeon in 1 week. Drink plenty of fluids and rest as needed. Call for any fever over 101, nausea, vomiting, severe pain, no passing of gas or bowel movement.       DIET   You may resume your regular diet as normal.       SURGICAL SITES   Remove outer dressing and keep white steri-strips in place allowing them to fall off on their own. You may shower 48 hours post-operatively but do not bathe or soak in the water for 1-2 weeks; pat dry. If you notice any signs of surgical site infection (ie. redness, swelling, pain, pus drainage, feculent material), please seek medical care immediately.      ACTIVITY Do not lift anything heavier than 10 pounds for 2 weeks (2-3 months for hernia, no excersice for 2 weeks) and avoid strenuous activity for 4-6 weeks.       PAIN CONTROL   You may take Motrin 600mg (with food) or Tylenol 650mg as needed for mild pain. Stagger one medication 3 hours after the other for maximum pain control. Maximum daily dose of Tylenol should not exceed 4grams/day.  You may take your prescribed oxycodone for severe breakthrough pain not that is not relieved by Tylenol/Motrin. Do not drive or make important decisions while taking this medication and do not take more than 4 pills in 24 hours. Please refer to medical records/ progress notes for in depth hospital course. Discharge plan discussed and agreed with attending.

## 2022-02-03 NOTE — BRIEF OPERATIVE NOTE - NSICDXBRIEFPREOP_GEN_ALL_CORE_FT
PRE-OP DIAGNOSIS:  Acute appendicitis with perforation and localized peritonitis 03-Feb-2022 10:48:31  Isabelle Craig

## 2022-02-03 NOTE — ASU PATIENT PROFILE, ADULT - FALL HARM RISK - UNIVERSAL INTERVENTIONS
Bed in lowest position, wheels locked, appropriate side rails in place/Call bell, personal items and telephone in reach/Instruct patient to call for assistance before getting out of bed or chair/Non-slip footwear when patient is out of bed/[place] to call system/Physically safe environment - no spills, clutter or unnecessary equipment/Purposeful Proactive Rounding/Room/bathroom lighting operational, light cord in reach

## 2022-02-07 NOTE — PLAN
[FreeTextEntry1] : Ms. INEZ NICKERSON Patient was told significance of findings, options, risks and benefits were explained. Informed consent for  interval appendectomy and potential risks, benefits and alternatives (surgical options were discussed including non-surgical options or the option of no surgery) to the planned surgery were discussed in depth. All surgical options were discussed including non-surgical treatments. The patient wishes to proceed with surgery. We will plan for surgery on at the next available date, pending any required insurance pre-certification or pre-approval. Patient agrees to obtain any necessary pre-operative evaluations and testing prior to surgery.\par Patient advised to seek immediate medical attention with any acute change in symptoms or with the development of any new or worsening symptoms. Patient's questions and concerns addressed to patient's satisfaction, and patient verbalized an understanding of the information discussed.\par \par Will schedule for the surgery at [facility].\par \par PST\par COVID\par Medical Clearance

## 2022-02-07 NOTE — HISTORY OF PRESENT ILLNESS
[de-identified] : INEZ NICKERSON is a 66 year old female with PMHx of HTN and DM and PSH of  who presents in the office for post hospitalization visit. Patient was admitted to [facility] with perforated appendicitis. Patient was treated with IV antibiotics and she underwent a CT-guided abscess IR drained. She was discharged home with IV antibiotics. Patient has IR drain that draining small amount of fluid. Patient is on oral antibiotics and will follow up with IR for drain evaluation and removal.

## 2022-02-07 NOTE — PHYSICAL EXAM
[Normal Breath Sounds] : Normal breath sounds [Normal Heart Sounds] : normal heart sounds [Normal Rate and Rhythm] : normal rate and rhythm [No Rash or Lesion] : No rash or lesion [Alert] : alert [Oriented to Person] : oriented to person [Oriented to Place] : oriented to place [Oriented to Time] : oriented to time [Calm] : calm [de-identified] : The patient is alert, well-groomed  [de-identified] : Normoactive bowel sounds, soft and nontender, no hepatosplenomegaly or masses noted, Patient has IR drain with minimal drainage  [de-identified] : full range of motion and no deformities appreciated.

## 2022-02-07 NOTE — ASSESSMENT
[FreeTextEntry1] : INEZ NICKERSON is a 66 year old female with Perforated appendicitis\par \par \par Patient is doing well. Patient has IR drain that draining small amount of fluid. Patient is on oral antibiotics and will follow up with IR for drain evaluation and removal. \par \par Results of her recent imaging and physical examination findings were discussed in detail. she was informed of significance of findings. All of the options, risks and benefits were explained. \par \par We recommend an interval appendectomy in the end of February. She agrees to proceed.

## 2022-02-08 PROBLEM — K21.9 GASTRO-ESOPHAGEAL REFLUX DISEASE WITHOUT ESOPHAGITIS: Chronic | Status: ACTIVE | Noted: 2022-01-25

## 2022-02-08 PROBLEM — G47.33 OBSTRUCTIVE SLEEP APNEA (ADULT) (PEDIATRIC): Chronic | Status: ACTIVE | Noted: 2022-01-25

## 2022-02-08 LAB — SURGICAL PATHOLOGY STUDY: SIGNIFICANT CHANGE UP

## 2022-02-22 ENCOUNTER — APPOINTMENT (OUTPATIENT)
Dept: SURGERY | Facility: CLINIC | Age: 67
End: 2022-02-22
Payer: MEDICARE

## 2022-02-22 VITALS
BODY MASS INDEX: 27.23 KG/M2 | HEIGHT: 62 IN | WEIGHT: 148 LBS | DIASTOLIC BLOOD PRESSURE: 84 MMHG | HEART RATE: 74 BPM | SYSTOLIC BLOOD PRESSURE: 153 MMHG

## 2022-02-22 VITALS — TEMPERATURE: 96.5 F

## 2022-02-22 DIAGNOSIS — K35.32 ACUTE APPENDICITIS W/ PERFORATION AND LOCALIZED PERITONITIS, W/O ABSCESS: ICD-10-CM

## 2022-02-22 PROCEDURE — 99024 POSTOP FOLLOW-UP VISIT: CPT

## 2022-03-03 NOTE — HISTORY OF PRESENT ILLNESS
[de-identified] : INEZ NICKERSON is a 66 year old female who presents in the office for postop visit. She underwent a Laparoscopic appendectomy and lysis of intestinal adhesions over 1 hour on 02/03/2022 pathology results are consistent with appendiceal diverticulosis with periappendicitis Today patient is doing well, offers no complaints. Denies any fevers, chills, nausea, vomiting, diarrhea or constipation. Patient able to tolerate regular diet with normal bowel movements. Surgical incisions are healing well. No sign of inflammation or exudate.\par

## 2022-03-03 NOTE — ASSESSMENT
[FreeTextEntry1] : INEZ NICKERSON is a 66 year old female who underwent a Laparoscopic appendectomy and lysis of intestinal adhesions 02/03/2022 pathology results are consistent with appendiceal diverticulosis with periappendicitis\par \par \par \par Patient is doing well. All surgical incisions are healing well and as expected. There is no evidence of infection or complication, and patient is progressing as expected. Post-operative wound care, activity, restrictions and precautions reinforced.  Pathology results were discussed in detail. Patient was instructed no heavy lifting  4-6 weeks. Patient's questions and concerns addressed to patient's satisfaction. \par

## 2022-03-03 NOTE — REASON FOR VISIT
[Post Op: _________] : a [unfilled] post op visit [FreeTextEntry1] : s/p Laparoscopic appendectomy and lysis of intestinal adhesions over 1 hour on 02/03/2022

## 2022-03-03 NOTE — PHYSICAL EXAM
[Normal Breath Sounds] : Normal breath sounds [Normal Heart Sounds] : normal heart sounds [Normal Rate and Rhythm] : normal rate and rhythm [No Rash or Lesion] : No rash or lesion [Alert] : alert [Oriented to Person] : oriented to person [Oriented to Place] : oriented to place [Oriented to Time] : oriented to time [Calm] : calm [de-identified] : The patient is alert, well-groomed  [de-identified] : Incision sites are healing well  [de-identified] : full range of motion and no deformities appreciated.

## 2022-09-21 NOTE — H&P PST ADULT - BMI (KG/M2)
Pt  is requesting forms to [facility] to be filled out since you are her pcp. He is faxing over the forms. I told him I would submit them for review to see if its something you can help out with.  
31.3

## 2023-05-17 NOTE — ASU PATIENT PROFILE, ADULT - NS PRO MODE OF ARRIVAL
How Many Skin Cancers Have You Had?: more than one What Is The Reason For Today's Visit?: Follow Up Non-Melanoma Skin Cancer When Was Your Last Cancer Diagnosed?: 10/2022 ambulatory
